# Patient Record
Sex: MALE | Race: WHITE | Employment: OTHER | URBAN - METROPOLITAN AREA
[De-identification: names, ages, dates, MRNs, and addresses within clinical notes are randomized per-mention and may not be internally consistent; named-entity substitution may affect disease eponyms.]

---

## 2021-10-14 ENCOUNTER — HOSPITAL ENCOUNTER (OUTPATIENT)
Dept: CT IMAGING | Age: 56
Discharge: HOME | End: 2021-10-14
Attending: NURSE PRACTITIONER

## 2021-10-14 DIAGNOSIS — R31.0 GROSS HEMATURIA: ICD-10-CM

## 2021-10-14 RX ORDER — SODIUM CHLORIDE 0.9 % (FLUSH) 0.9 %
10 SYRINGE (ML) INJECTION
Status: COMPLETED | OUTPATIENT
Start: 2021-10-14 | End: 2021-10-14

## 2021-10-14 RX ADMIN — Medication 10 ML: at 08:44

## 2021-10-14 NOTE — PROGRESS NOTES
CT shows the kidneys to over all look good. We will need to proceed with cystoscopy ( a camera look up in the bladder. ) we can have this scheduled in the office. WE will call you to arrange a time.

## 2022-06-07 ENCOUNTER — OFFICE VISIT (OUTPATIENT)
Dept: UROLOGY | Age: 57
End: 2022-06-07
Payer: MEDICARE

## 2022-06-07 DIAGNOSIS — N40.1 BENIGN PROSTATIC HYPERPLASIA WITH LOWER URINARY TRACT SYMPTOMS, SYMPTOM DETAILS UNSPECIFIED: ICD-10-CM

## 2022-06-07 DIAGNOSIS — N52.9 ERECTILE DYSFUNCTION, UNSPECIFIED ERECTILE DYSFUNCTION TYPE: Primary | ICD-10-CM

## 2022-06-07 DIAGNOSIS — R31.0 GROSS HEMATURIA: ICD-10-CM

## 2022-06-07 LAB
BILIRUBIN, URINE, POC: NEGATIVE
BLOOD URINE, POC: NORMAL
GLUCOSE URINE, POC: NEGATIVE
KETONES, URINE, POC: NEGATIVE
LEUKOCYTE ESTERASE, URINE, POC: NORMAL
NITRITE, URINE, POC: NEGATIVE
PH, URINE, POC: 6.5 (ref 4.6–8)
PROTEIN,URINE, POC: NEGATIVE
SPECIFIC GRAVITY, URINE, POC: 1 (ref 1–1.03)
URINALYSIS CLARITY, POC: NORMAL
URINALYSIS COLOR, POC: NORMAL
UROBILINOGEN, POC: NORMAL

## 2022-06-07 PROCEDURE — G8428 CUR MEDS NOT DOCUMENT: HCPCS | Performed by: NURSE PRACTITIONER

## 2022-06-07 PROCEDURE — 99214 OFFICE O/P EST MOD 30 MIN: CPT | Performed by: NURSE PRACTITIONER

## 2022-06-07 PROCEDURE — 4004F PT TOBACCO SCREEN RCVD TLK: CPT | Performed by: NURSE PRACTITIONER

## 2022-06-07 PROCEDURE — G8417 CALC BMI ABV UP PARAM F/U: HCPCS | Performed by: NURSE PRACTITIONER

## 2022-06-07 PROCEDURE — 3017F COLORECTAL CA SCREEN DOC REV: CPT | Performed by: NURSE PRACTITIONER

## 2022-06-07 PROCEDURE — 81003 URINALYSIS AUTO W/O SCOPE: CPT | Performed by: NURSE PRACTITIONER

## 2022-06-07 RX ORDER — FINASTERIDE 5 MG/1
5 TABLET, FILM COATED ORAL DAILY
Qty: 90 TABLET | Refills: 3 | Status: SHIPPED | OUTPATIENT
Start: 2022-06-07

## 2022-06-07 RX ORDER — PAROXETINE HYDROCHLORIDE 20 MG/1
20 TABLET, FILM COATED ORAL DAILY
COMMUNITY

## 2022-06-07 RX ORDER — GLIPIZIDE 5 MG/1
TABLET, FILM COATED, EXTENDED RELEASE ORAL
COMMUNITY
Start: 2022-04-29

## 2022-06-07 ASSESSMENT — ENCOUNTER SYMPTOMS
RESPIRATORY NEGATIVE: 1
EYES NEGATIVE: 1

## 2022-06-07 NOTE — PROGRESS NOTES
[address], [address]  [phone]          Ketan Palacios  : 1965    Chief Complaint   Patient presents with    Hematuria          HPI     Rimma Boston is a 55 y.o. male    Here today with complaints of gross hematuria. He reports that hematuria has continued to be on and off since he had a hematuria work-up in October. The urine at times is just pink-tinged. He has not had any bright red blood or clotting. 2021 he had cystoscopy CT and the cystoscopy showed a severely obstructed lateral lobe enlargement, 3 cm. 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 He denies any dysuria or feeling of incomplete emptying. He is up 1 time a night to void. He is also concerned about his ED. He is currently using Trimix and at times is not successful with a full erection. He has been using up to 0.4 mL. He does have some discomfort with injection and will have some bruising due to Xarelto usage. He is wanting to discuss other options. Past Medical History:   Diagnosis Date    Diabetes ([address]     Hypertension      Past Surgical History:   Procedure Laterality Date    OTHER SURGICAL HISTORY      spinal    OTHER SURGICAL HISTORY      knee    OTHER SURGICAL HISTORY      nasal surgery      Current Outpatient Medications   Medication Sig Dispense Refill    glipiZIDE (GLUCOTROL XL) 5 MG extended release tablet TAKE 1 TABLET BY MOUTH EVERY DAY      PARoxetine (PAXIL) 20 MG tablet Take 20 mg by mouth daily      finasteride (PROSCAR) 5 MG tablet Take 1 tablet by mouth daily 90 tablet 3    Papav-Phentolamine-Alprostadil (SUPER TRI-MIX) 150- MG-MG-MCG SOLR 1 each by IntraCAVernosal route as needed (erectile dysfunction) PGE1: 40 mcg/ml; Papaverine: 30 mg/ml, Phentolamine: 3 mg/ml;  + all other supplies as needed.  1 each 5    fexofenadine (ALLEGRA) 180 MG tablet Take 180 mg by mouth daily      gabapentin (NEURONTIN) 300 MG capsule Take 300 mg by mouth 3 times daily.  lacosamide (VIMPAT) 100 MG TABS tablet Take 100 mg by mouth 2 times daily.  lacosamide (VIMPAT) 50 MG TABS tablet Take 2 bid      lamoTRIgine (LAMICTAL) 25 MG tablet Take one bid for one month than 2 bid for one month than 3 bid      losartan (COZAAR) 100 MG tablet Take by mouth      metFORMIN (GLUCOPHAGE) 500 MG tablet Take 2,000 mg by mouth      pravastatin (PRAVACHOL) 20 MG tablet Take 20 mg by mouth      rivaroxaban (XARELTO) 20 MG TABS tablet Take 20 mg by mouth daily (with breakfast)      rOPINIRole (REQUIP) 1 MG tablet Take 1 mg by mouth 5 times daily      triamcinolone (NASACORT) 55 MCG/ACT nasal inhaler 2 sprays by Nasal route daily      zolpidem (AMBIEN CR) 12.5 MG extended release tablet Take 12.5 mg by mouth.  divalproex (DEPAKOTE) 250 MG DR tablet Take 250 mg by mouth 2 times daily      HYDROcodone-acetaminophen (NORCO)  MG per tablet TAKE 1 TABLET BY MOUTH DAILY      OXcarbazepine (TRILEPTAL) 150 MG tablet Take 150 mg by mouth 2 times daily       No current facility-administered medications for this visit.      No Known Allergies  Social History     Socioeconomic History    Marital status: Single     Spouse name: Not on file    Number of children: Not on file    Years of education: Not on file    Highest education level: Not on file   Occupational History    Not on file   Tobacco Use    Smoking status: Current Every Day Smoker     Packs/day: 1.00    Smokeless tobacco: Never Used   Substance and Sexual Activity    Alcohol use: Not Currently    Drug use: Not on file    Sexual activity: Not on file   Other Topics Concern    Not on file   Social History Narrative    Not on file     Social Determinants of Health     Financial Resource Strain:     Difficulty of Paying Living Expenses: Not on file   Food Insecurity:     Worried About Running Out of Food in the Last Year: Not on file    Emma of Food in the Last Year: Not on file   Transportation Needs:     Lack of Transportation (Medical): Not on file    Lack of Transportation (Non-Medical): Not on file   Physical Activity:     Days of Exercise per Week: Not on file    Minutes of Exercise per Session: Not on file   Stress:     Feeling of Stress : Not on file   Social Connections:     Frequency of Communication with Friends and Family: Not on file    Frequency of Social Gatherings with Friends and Family: Not on file    Attends Voodoo Services: Not on file    Active Member of [address] [place] [address] [employer] or Organizations: Not on file    Attends Club or Organization Meetings: Not on file    Marital Status: Not on file   Intimate Partner Violence:     Fear of Current or Ex-Partner: Not on file    Emotionally Abused: Not on file    Physically Abused: Not on file    Sexually Abused: Not on file   Housing Stability:     Unable to Pay for Housing in the Last Year: Not on file    Number of [place] in the Last Year: Not on file    Unstable Housing in the Last Year: Not on file     Family History   Problem Relation Age of Onset    Cancer Father     Diabetes Father        Review of Systems  Constitutional: Negative  Skin: Negative skin ROS  Eyes: Eyes negative  ENT: HENT negative  Respiratory: Respiratory negative  Cardiovascular: Positive for hypertension. GI: Neg GI ROS  Genitourinary: Positive for hematuria.   Musculoskeletal: Musculoskeletal negative  Neurological: Neg neuro ROS  Psychological: Neg psych ROS  Endocrine: Endocrine negative  Hem/Lymphatic: Hematologic/lymphatic negative      Urinalysis  UA - Dipstick  Results for orders placed or performed in visit on 06/07/22   AMB POC URINALYSIS DIP STICK AUTO W/O MICRO   Result Value Ref Range    Color (UA POC)      Clarity (UA POC)      Glucose, Urine, POC Negative Negative    Bilirubin, Urine, POC Negative Negative    KETONES, Urine, POC Negative Negative    Specific Gravity, Urine, POC 1.005 1.001 - 1.035 Blood (UA POC) Large Negative    pH, Urine, POC 6.5 4.6 - 8.0    Protein, Urine, POC Negative Negative    Urobilinogen, POC 0.2 mg/dL     Nitrite, Urine, POC Negative Negative    Leukocyte Esterase, Urine, POC Trace Negative       PHYSICAL EXAM    GENERAL: No acute distress, Awake, Alert, Oriented X 3, Gait normal  ABDOMEN: soft, non tender, non-distended, positive bowel sounds, no organomegaly, no palpable masses, no guarding, no rebound tenderness  SKIN: No rash, no erythema, no lacerations or abrasions, no ecchymosis  MUSCULOSKELETAL - MAEW, no edema     Assessment and Plan    ICD-10-CM    1. Erectile dysfunction, unspecified erectile dysfunction type  N52.9 AMB POC URINALYSIS DIP STICK AUTO W/O MICRO     Papav-Phentolamine-Alprostadil (SUPER TRI-MIX) 150- MG-MG-MCG SOLR   2. Gross hematuria  R31.0    3. Benign prostatic hyperplasia with lower urinary tract symptoms, symptom details unspecified  N40.1      ED DISCUSSION:  We discussed all treatment options including PDE 5 inhibitors, injection therapy, urethral suppository, vacuum erection device and penile prostheses. PLAN:  He would like to try super trimix to see If he has better response. Penile vacuum pump info was also given per his request.   We will add finasteride   He has follow up in Oct with Dr. Barber Ramos. He will keep that appt. Jian Cuellar, NP, APRN - NP  Dr. Yarelis Lam is supervising physician today and he approves plan of care. Return for should have OV with Barber Ramos in Oct..     Elements of this note have been dictated using speech recognition software. Although reviewed, errors of speech recognition may have occurred.

## 2022-06-08 ENCOUNTER — PATIENT MESSAGE (OUTPATIENT)
Dept: UROLOGY | Age: 57
End: 2022-06-08

## 2022-06-08 NOTE — TELEPHONE ENCOUNTER
From: Amol Palacios  To: Scott Lopez  Sent: 6/8/2022 10:11 AM EDT  Subject: PSA    With this bleeding being related to prostate, is my PSA a concern? And is this the beginning of prostate issues I need to be concerned about? I forgot to ask these questions yesterday.     Thanks,   Charlene Rutledge

## 2022-08-12 ENCOUNTER — OFFICE VISIT (OUTPATIENT)
Dept: NEUROLOGY | Age: 57
End: 2022-08-12
Payer: MEDICARE

## 2022-08-12 DIAGNOSIS — G62.9 NEUROPATHY: Primary | ICD-10-CM

## 2022-08-12 DIAGNOSIS — M46.20 SPINAL ABSCESS (HCC): ICD-10-CM

## 2022-08-12 DIAGNOSIS — G56.03 BILATERAL CARPAL TUNNEL SYNDROME: ICD-10-CM

## 2022-08-12 DIAGNOSIS — G25.3 MYOCLONIC JERKING: ICD-10-CM

## 2022-08-12 PROCEDURE — G8427 DOCREV CUR MEDS BY ELIG CLIN: HCPCS | Performed by: PSYCHIATRY & NEUROLOGY

## 2022-08-12 PROCEDURE — 4004F PT TOBACCO SCREEN RCVD TLK: CPT | Performed by: PSYCHIATRY & NEUROLOGY

## 2022-08-12 PROCEDURE — 3017F COLORECTAL CA SCREEN DOC REV: CPT | Performed by: PSYCHIATRY & NEUROLOGY

## 2022-08-12 PROCEDURE — G8417 CALC BMI ABV UP PARAM F/U: HCPCS | Performed by: PSYCHIATRY & NEUROLOGY

## 2022-08-12 PROCEDURE — 99214 OFFICE O/P EST MOD 30 MIN: CPT | Performed by: PSYCHIATRY & NEUROLOGY

## 2022-08-12 RX ORDER — LACOSAMIDE 100 MG/1
TABLET ORAL
Qty: 75 TABLET | Refills: 5 | Status: SHIPPED | OUTPATIENT
Start: 2022-08-12 | End: 2022-09-11

## 2022-08-12 ASSESSMENT — ENCOUNTER SYMPTOMS
EYES NEGATIVE: 1
GASTROINTESTINAL NEGATIVE: 1
ALLERGIC/IMMUNOLOGIC NEGATIVE: 1
RESPIRATORY NEGATIVE: 1

## 2022-08-12 NOTE — PROGRESS NOTES
[address], [address], [address]  Phone: [phone] Fax [phone]  Dr. Michaelle Rocha      8/12/2022  [address]     Patient is referred by the following provider for consultation regarding as below:       I reviewed the available records and notes and have examined patient with the following findings:     Chief Complaint:  No chief complaint on file. HPI: This is a right handed 55 y.o.  male who is very pleasant very appropriate gentleman who unfortunately in 2006 had an acute onset of paralysis of his bilateral lower extremities loss of bowel or bladder and in the work-up they quickly found that he and a bacterial infection I believe an abscess that required thoracic surgical intervention with drainage and antibiotics. 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 He is on high doses of Requip for about 10 years using 5 pills a day but he developed OCD behaviorism's with purchasing silver. He had already failed Depakote Trileptal Lamictal gabapentin and was on this high dose of Requip that we weaned off of. 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 We did set the patient up for an MRI of the cervical and thoracic spine last evaluation he felt he was assured he had it done and we could not find the imaging studies. He now tells me he is not sure if he ever had it done. And we cannot find them anywhere. He has failed on Depakote Trileptal Lamictal Requip and gabapentin.   He was tried on Vimpat 100 mg he takes 1 twice a day and it is definitely help with the myoclonus during the day but at night it still very bothersome. He states his neuropathy in his hands is progressively gotten much worse he had EMG and nerve studies in the past but that was several years ago there were normal for carpal tunnel. But I think is worthwhile redoing. IMAGING REVIEW:  I REVIEWED PERTINENT  IMAGES AND REPORTS WITH THE PATIENT PERSONALLY, DIRECTLY AND FULLY.      Past Medical History:  Past Medical History:   Diagnosis Date    Diabetes ([address]     Hypertension        Past Surgical History:  Past Surgical History:   Procedure Laterality Date    OTHER SURGICAL HISTORY  2006    spinal    OTHER SURGICAL HISTORY  1990    knee    OTHER SURGICAL HISTORY  1995    nasal surgery        Social History:  Social History     Socioeconomic History    Marital status: Single     Spouse name: Not on file    Number of children: Not on file    Years of education: Not on file    Highest education level: Not on file   Occupational History    Not on file   Tobacco Use    Smoking status: Every Day     Packs/day: 1.00     Types: Cigarettes    Smokeless tobacco: Never   Substance and Sexual Activity    Alcohol use: Not Currently    Drug use: Not on file    Sexual activity: Not on file   Other Topics Concern    Not on file   Social History Narrative    Not on file     Social Determinants of Health     Financial Resource Strain: Not on file   Food Insecurity: Not on file   Transportation Needs: Not on file   Physical Activity: Not on file   Stress: Not on file   Social Connections: Not on file   Intimate Partner Violence: Not on file   Housing Stability: Not on file       Family History:   Family History   Problem Relation Age of Onset    Cancer Father     Diabetes Father        Current Outpatient Medications on File Prior to Visit   Medication Sig Dispense Refill    Papav-Phentolamine-Alprostadil (SUPER TRI-MIX) 150- MG-MG-MCG SOLR 1 each by IntraCAVernosal route as needed (erectile dysfunction) PGE1: 20 mcg/ml; Papaverine: 30 mg/ml, Phentolamine: 1 mg/ml;  + all other supplies as needed. 1 each 5    glipiZIDE (GLUCOTROL XL) 5 MG extended release tablet TAKE 1 TABLET BY MOUTH EVERY DAY      PARoxetine (PAXIL) 20 MG tablet Take 20 mg by mouth daily      finasteride (PROSCAR) 5 MG tablet Take 1 tablet by mouth daily 90 tablet 3    Papav-Phentolamine-Alprostadil (SUPER TRI-MIX) 150- MG-MG-MCG SOLR 1 each by IntraCAVernosal route as needed (erectile dysfunction) PGE1: 40 mcg/ml; Papaverine: 30 mg/ml, Phentolamine: 3 mg/ml;  + all other supplies as needed. 1 each 5    divalproex (DEPAKOTE) 250 MG DR tablet Take 250 mg by mouth 2 times daily      fexofenadine (ALLEGRA) 180 MG tablet Take 180 mg by mouth daily      gabapentin (NEURONTIN) 300 MG capsule Take 300 mg by mouth 3 times daily. HYDROcodone-acetaminophen (NORCO)  MG per tablet TAKE 1 TABLET BY MOUTH DAILY      lacosamide (VIMPAT) 100 MG TABS tablet Take 100 mg by mouth 2 times daily. lacosamide (VIMPAT) 50 MG TABS tablet Take 2 bid      lamoTRIgine (LAMICTAL) 25 MG tablet Take one bid for one month than 2 bid for one month than 3 bid      losartan (COZAAR) 100 MG tablet Take by mouth      metFORMIN (GLUCOPHAGE) 500 MG tablet Take 2,000 mg by mouth      OXcarbazepine (TRILEPTAL) 150 MG tablet Take 150 mg by mouth 2 times daily      pravastatin (PRAVACHOL) 20 MG tablet Take 20 mg by mouth      rivaroxaban (XARELTO) 20 MG TABS tablet Take 20 mg by mouth daily (with breakfast)      rOPINIRole (REQUIP) 1 MG tablet Take 1 mg by mouth 5 times daily      triamcinolone (NASACORT) 55 MCG/ACT nasal inhaler 2 sprays by Nasal route daily      zolpidem (AMBIEN CR) 12.5 MG extended release tablet Take 12.5 mg by mouth. No current facility-administered medications on file prior to visit. No Known Allergies    Review of Systems:  Review of Systems   Constitutional: Negative. Eyes: Negative. Respiratory: Negative. Cardiovascular: Negative.     Gastrointestinal: Negative. Endocrine: Negative. Genitourinary: Negative. Musculoskeletal: Negative. Skin: Negative. Allergic/Immunologic: Negative. Neurological:         Myoclonic jerking numbness of the bilateral lower extremities and severe numbness of the bilateral upper extremities. Hematological: Negative. No flowsheet data found. No flowsheet data found. There were no vitals filed for this visit. Physical Exam  Constitutional:       Appearance: Normal appearance. HENT:      Head: Normocephalic and atraumatic. Eyes:      Extraocular Movements: Extraocular movements intact and EOM normal.      Pupils: Pupils are equal, round, and reactive to light. Cardiovascular:      Rate and Rhythm: Normal rate and regular rhythm. Pulmonary:      Effort: Pulmonary effort is normal.   Abdominal:      Palpations: Abdomen is soft. Neurological:      Mental Status: He is alert and oriented to person, place, and time. Deep Tendon Reflexes:      Reflex Scores:       Tricep reflexes are 1+ on the right side and 1+ on the left side. Bicep reflexes are 1+ on the right side and 1+ on the left side. Brachioradialis reflexes are 1+ on the right side and 1+ on the left side. Patellar reflexes are 2+ on the right side and 2+ on the left side. Achilles reflexes are 2+ on the right side and 2+ on the left side. Neurologic Exam     Mental Status   Oriented to person, place, and time. Attention: normal. Concentration: normal.   Level of consciousness: alert  Knowledge: good. Cranial Nerves     CN II   Visual fields full to confrontation. CN III, IV, VI   Pupils are equal, round, and reactive to light. Extraocular motions are normal.     CN VII   Facial expression full, symmetric.      Motor Exam   Right arm tone: normal  Left arm tone: normal  Right leg tone: normal  Left leg tone: normal    Sensory Exam   Almost complete loss of pinprick and a decrease in vibratory negative. But it is worth working back up since his upper extremities are worse with neuropathy not his lowers. Were waiting for the EMG nerve conduction study to be redone. The Diagnosis and differential diagnostic considerations, and Rx Tx were reviewed with the patient at length. No orders of the defined types were placed in this encounter. I have spent greater than 50% of visit discussing and counseling of patient  for treatment and diagnostic plan review. Total time 30 min     . Notes: Patient is to continue all medications as directed by prescribing physicians. Continuations on today's visit are made based on the patient's report of current medications.              Dr. Kaylah Dejesus  Consultation Neurology, Neurodiagnostics and Neurotherapeutics  Neuroelectrophysiology, EEG, EMG  [facility]  [address] [place] [address], [address] [facility]  Phone:  [phone]  Fax:   [phone]

## 2022-08-26 ENCOUNTER — OFFICE VISIT (OUTPATIENT)
Dept: NEUROLOGY | Age: 57
End: 2022-08-26
Payer: MEDICARE

## 2022-08-26 DIAGNOSIS — G56.03 BILATERAL CARPAL TUNNEL SYNDROME: Primary | ICD-10-CM

## 2022-08-26 DIAGNOSIS — M79.641 PAIN IN BOTH HANDS: ICD-10-CM

## 2022-08-26 DIAGNOSIS — R20.0 NUMBNESS AND TINGLING IN BOTH HANDS: ICD-10-CM

## 2022-08-26 DIAGNOSIS — R20.2 NUMBNESS AND TINGLING IN BOTH HANDS: ICD-10-CM

## 2022-08-26 DIAGNOSIS — M79.642 PAIN IN BOTH HANDS: ICD-10-CM

## 2022-08-26 PROCEDURE — 95913 NRV CNDJ TEST 13/> STUDIES: CPT | Performed by: PSYCHIATRY & NEUROLOGY

## 2022-08-26 PROCEDURE — 95885 MUSC TST DONE W/NERV TST LIM: CPT | Performed by: PSYCHIATRY & NEUROLOGY

## 2022-08-26 NOTE — PROGRESS NOTES
EMG/Nerve Conduction Study Procedure Note  [address]    [facility], [address] [facility]   [phone]      See above procedure EMG note. Please Note[de-identified]     Data and waveforms * filed under Procedure category [facility]. See Procedure Files for complete data pages. Tamiko Story MD  Consultative Neurology, Neurodiagnostics   [facility]    [address] [place] [address], [address] [place] [address]  Phone:  [phone]  Fax:   [phone]          + + +   Glossary:   MUP: motor unit potential;  SNAP: sensory nerve action potential; Fibs:  Fibrillations; Fascic: fasciculations; IA: insertional activity;  IP: interference pattern;  SCV :  Sensory conduction velocity;  MCV: motor conduction velocity; NOTE[de-identified] muscles are abbreviated latin initials. Nicolet raw datafile[de-identified]   * filed at Procedure or [employer].  *

## 2022-08-29 ENCOUNTER — PATIENT MESSAGE (OUTPATIENT)
Dept: NEUROLOGY | Age: 57
End: 2022-08-29

## 2022-08-29 DIAGNOSIS — R20.2 NUMBNESS AND TINGLING IN BOTH HANDS: ICD-10-CM

## 2022-08-29 DIAGNOSIS — R20.0 NUMBNESS AND TINGLING IN BOTH HANDS: ICD-10-CM

## 2022-08-29 DIAGNOSIS — G56.03 BILATERAL CARPAL TUNNEL SYNDROME: Primary | ICD-10-CM

## 2022-08-30 NOTE — TELEPHONE ENCOUNTER
From: Asuncion Palacios  To: Dr. Jose Rosenbergure: 8/29/2022 6:42 PM EDT  Subject: Nerve Test Results    Dr. Aurora Bear, based on my nerve test results, do you recommend the Carpal Tunnel surgery? and if yes, on one or both hands? What is the next step?     Thanks,   Jai Stone

## 2022-08-30 NOTE — TELEPHONE ENCOUNTER
Please let him know that the EMG and nerve conduction study only suggest carpal tunnel syndrome underneath his neuropathy. That does not mean he is going to have surgery. He should probably wear carpal tunnel syndrome wrist braces from the pharmacy. And if he would like to see a hand surgeon for this we certainly can set this up. This does not mean they are going to do surgery though.

## 2022-08-30 NOTE — TELEPHONE ENCOUNTER
Dr. Aurora Bear . .. I placed the referral the The [address] [place] [address] with COLT. I informed the patient. Closing this encounter.

## 2022-08-30 NOTE — TELEPHONE ENCOUNTER
Chart reviewed. . patient had EMGs with Dr. Kohli on 8/26/22.   Patient's scheduled F/U with you is on 2/2/2023

## 2022-10-26 DIAGNOSIS — N40.1 BENIGN PROSTATIC HYPERPLASIA WITH LOWER URINARY TRACT SYMPTOMS, SYMPTOM DETAILS UNSPECIFIED: ICD-10-CM

## 2022-10-26 DIAGNOSIS — N52.9 ERECTILE DYSFUNCTION, UNSPECIFIED ERECTILE DYSFUNCTION TYPE: Primary | ICD-10-CM

## 2022-10-27 LAB — PSA SERPL-MCNC: 2.1 NG/ML

## 2022-11-01 ENCOUNTER — OFFICE VISIT (OUTPATIENT)
Dept: UROLOGY | Age: 57
End: 2022-11-01
Payer: MEDICARE

## 2022-11-01 DIAGNOSIS — N52.9 ERECTILE DYSFUNCTION, UNSPECIFIED ERECTILE DYSFUNCTION TYPE: ICD-10-CM

## 2022-11-01 DIAGNOSIS — R31.0 GROSS HEMATURIA: Primary | ICD-10-CM

## 2022-11-01 DIAGNOSIS — N40.1 BENIGN PROSTATIC HYPERPLASIA WITH LOWER URINARY TRACT SYMPTOMS, SYMPTOM DETAILS UNSPECIFIED: ICD-10-CM

## 2022-11-01 LAB
BILIRUBIN, URINE, POC: NEGATIVE
BLOOD URINE, POC: ABNORMAL
GLUCOSE URINE, POC: NEGATIVE
KETONES, URINE, POC: NEGATIVE
LEUKOCYTE ESTERASE, URINE, POC: NEGATIVE
NITRITE, URINE, POC: POSITIVE
PH, URINE, POC: 6.5 (ref 4.6–8)
PROTEIN,URINE, POC: 100
SPECIFIC GRAVITY, URINE, POC: 1.02 (ref 1–1.03)
URINALYSIS CLARITY, POC: ABNORMAL
URINALYSIS COLOR, POC: ABNORMAL
UROBILINOGEN, POC: NORMAL

## 2022-11-01 PROCEDURE — G8484 FLU IMMUNIZE NO ADMIN: HCPCS | Performed by: UROLOGY

## 2022-11-01 PROCEDURE — G8427 DOCREV CUR MEDS BY ELIG CLIN: HCPCS | Performed by: UROLOGY

## 2022-11-01 PROCEDURE — G8421 BMI NOT CALCULATED: HCPCS | Performed by: UROLOGY

## 2022-11-01 PROCEDURE — 3017F COLORECTAL CA SCREEN DOC REV: CPT | Performed by: UROLOGY

## 2022-11-01 PROCEDURE — 99214 OFFICE O/P EST MOD 30 MIN: CPT | Performed by: UROLOGY

## 2022-11-01 PROCEDURE — 81003 URINALYSIS AUTO W/O SCOPE: CPT | Performed by: UROLOGY

## 2022-11-01 PROCEDURE — 4004F PT TOBACCO SCREEN RCVD TLK: CPT | Performed by: UROLOGY

## 2022-11-01 ASSESSMENT — ENCOUNTER SYMPTOMS
RESPIRATORY NEGATIVE: 1
EYES NEGATIVE: 1

## 2022-11-01 NOTE — PROGRESS NOTES
[address], [address]  [phone]          Eduard Palacios  : 1965    Chief Complaint   Patient presents with    Erectile Dysfunction          HPI     Santo Sheets is a 65 y.o. malefollowed in regards to ED, bph, and h/o gross hematuria. He tried 20 mg tadalafil and it would rarely cause a mild erection. He had a blood clot on his spine in approximately  and has a DVT as well as well. He is on xarelto. He now uses super trimix in regards to ed and wishes it was stronger. He returned in 10/21 with blood in the urine. Cystoscopy 10/21 revealed friable prostatic vasculature. He began finasteride. He still sees gross hematuria at times. Past Medical History:   Diagnosis Date    Diabetes ([address]     Hypertension      Past Surgical History:   Procedure Laterality Date    OTHER SURGICAL HISTORY      spinal    OTHER SURGICAL HISTORY      knee    OTHER SURGICAL HISTORY      nasal surgery      Current Outpatient Medications   Medication Sig Dispense Refill    Papav-Phentolamine-Alprostadil (SUPER TRI-MIX) 150- MG-MG-MCG SOLR 1 each by IntraCAVernosal route as needed (erectile dysfunction) PGE1: 40 mcg/ml; Papaverine: 30 mg/ml, Phentolamine: 3 mg/ml;  + all other supplies as needed. 1 each 5    glipiZIDE (GLUCOTROL XL) 5 MG extended release tablet TAKE 1 TABLET BY MOUTH EVERY DAY      PARoxetine (PAXIL) 20 MG tablet Take 20 mg by mouth daily      finasteride (PROSCAR) 5 MG tablet Take 1 tablet by mouth daily 90 tablet 3    Papav-Phentolamine-Alprostadil (SUPER TRI-MIX) 150- MG-MG-MCG SOLR 1 each by IntraCAVernosal route as needed (erectile dysfunction) PGE1: 40 mcg/ml; Papaverine: 30 mg/ml, Phentolamine: 3 mg/ml;  + all other supplies as needed. 1 each 5    fexofenadine (ALLEGRA) 180 MG tablet Take 180 mg by mouth daily      gabapentin (NEURONTIN) 300 MG capsule Take 300 mg by mouth 3 times daily. lamoTRIgine (LAMICTAL) 25 MG tablet Take one bid for one month than 2 bid for one month than 3 bid      losartan (COZAAR) 100 MG tablet Take by mouth      metFORMIN (GLUCOPHAGE) 500 MG tablet Take 2,000 mg by mouth      OXcarbazepine (TRILEPTAL) 150 MG tablet Take 150 mg by mouth 2 times daily      pravastatin (PRAVACHOL) 20 MG tablet Take 20 mg by mouth      rivaroxaban (XARELTO) 20 MG TABS tablet Take 20 mg by mouth daily (with breakfast)      rOPINIRole (REQUIP) 1 MG tablet Take 1 mg by mouth 5 times daily      triamcinolone (NASACORT) 55 MCG/ACT nasal inhaler 2 sprays by Nasal route daily      zolpidem (AMBIEN CR) 12.5 MG extended release tablet Take 12.5 mg by mouth. lacosamide (VIMPAT) 100 MG TABS tablet Take one in AM and 1.5 in PM 75 tablet 5    Papav-Phentolamine-Alprostadil (SUPER TRI-MIX) 150- MG-MG-MCG SOLR 1 each by IntraCAVernosal route as needed (erectile dysfunction) PGE1: 20 mcg/ml; Papaverine: 30 mg/ml, Phentolamine: 1 mg/ml;  + all other supplies as needed. 1 each 5    divalproex (DEPAKOTE) 250 MG DR tablet Take 250 mg by mouth 2 times daily      HYDROcodone-acetaminophen (NORCO)  MG per tablet TAKE 1 TABLET BY MOUTH DAILY      lacosamide (VIMPAT) 100 MG TABS tablet Take 100 mg by mouth 2 times daily. lacosamide (VIMPAT) 50 MG TABS tablet Take 2 bid       No current facility-administered medications for this visit.      No Known Allergies  Social History     Socioeconomic History    Marital status: Single     Spouse name: Not on file    Number of children: Not on file    Years of education: Not on file    Highest education level: Not on file   Occupational History    Not on file   Tobacco Use    Smoking status: Every Day     Packs/day: 1.00     Types: Cigarettes    Smokeless tobacco: Never   Substance and Sexual Activity    Alcohol use: Not Currently    Drug use: Not on file    Sexual activity: Not on file   Other Topics Concern    Not on file   Social History Narrative    Not on file     Social Determinants of Health     Financial Resource Strain: Not on file   Food Insecurity: Not on file   Transportation Needs: Not on file   Physical Activity: Not on file   Stress: Not on file   Social Connections: Not on file   Intimate Partner Violence: Not on file   Housing Stability: Not on file     Family History   Problem Relation Age of Onset    Cancer Father     Diabetes Father        Review of Systems  Constitutional: Negative  Negative for fever. Skin: Negative skin ROS  Eyes: Eyes negative  ENT: HENT negative  Respiratory: Respiratory negative  Cardiovascular: Positive for hypertension. GI: Neg GI ROS  Genitourinary: Positive for hematuria and erectile dysfunction. Musculoskeletal: Musculoskeletal negative  Neurological: Neg neuro ROS  Psychological: Neg psych ROS  Endocrine: Endocrine negative  Hem/Lymphatic: Hematologic/lymphatic negative    Urinalysis  UA - Dipstick  Results for orders placed or performed in visit on 11/01/22   AMB POC URINALYSIS DIP STICK AUTO W/O MICRO   Result Value Ref Range    Color (UA POC)      Clarity (UA POC)      Glucose, Urine, POC Negative Negative    Bilirubin, Urine, POC Negative Negative    KETONES, Urine, POC Negative Negative    Specific Gravity, Urine, POC 1.020 1.001 - 1.035    Blood (UA POC) Large Negative    pH, Urine, POC 6.5 4.6 - 8.0    Protein, Urine,  Negative    Urobilinogen, POC Normal     Nitrite, Urine, POC Positive Negative    Leukocyte Esterase, Urine, POC Negative Negative       There were no vitals taken for this visit. GENERAL: NAD, ALERT, A&O x 3, GAIT NORMAL  LUNGS: easy work of breathing  ABDOMEN: soft, non tender  NEUROLOGIC: cranial nerves 2-12 grossly intact           Assessment and Plan    ICD-10-CM    1. Gross hematuria  R31.0 AMB POC URINALYSIS DIP STICK AUTO W/O MICRO      2.  Erectile dysfunction, unspecified erectile dysfunction type  N52.9 AMB POC URINALYSIS DIP STICK AUTO W/O MICRO Papav-Phentolamine-Alprostadil (SUPER TRI-MIX) 150- MG-MG-MCG SOLR      3. Benign prostatic hyperplasia with lower urinary tract symptoms, symptom details unspecified  N40.1 AMB POC URINALYSIS DIP STICK AUTO W/O MICRO        I had a long discussion today with pt. in regards to hematuria. We discussed potential causes such as infection, friable prostatic vessels, bladder tumor, renal tumor, nephrolithiasis, or idiopathic cause. In regards to work up, CT abd/pelvis without contrast/with contrast/with delayed images was ordered for upper tract imaging. Lastly, pt. will follow up for cystoscopy and to go over results. Super Super trimix was prescribed in regards to ed.

## 2022-11-09 DIAGNOSIS — R31.0 GROSS HEMATURIA: Primary | ICD-10-CM

## 2022-11-16 ENCOUNTER — HOSPITAL ENCOUNTER (OUTPATIENT)
Dept: CT IMAGING | Age: 57
Discharge: HOME | End: 2022-11-18
Payer: MEDICARE

## 2022-11-16 DIAGNOSIS — R31.0 GROSS HEMATURIA: ICD-10-CM

## 2022-11-16 LAB — CREAT BLD-MCNC: 0.67 MG/DL (ref 0.8–1.5)

## 2022-11-16 PROCEDURE — 6360000004 HC RX CONTRAST MEDICATION: Performed by: UROLOGY

## 2022-11-16 PROCEDURE — 82565 ASSAY OF CREATININE: CPT

## 2022-11-16 PROCEDURE — 74178 CT ABD&PLV WO CNTR FLWD CNTR: CPT

## 2022-11-16 RX ADMIN — IOPAMIDOL 100 ML: 755 INJECTION, SOLUTION INTRAVENOUS at 13:48

## 2022-11-30 ENCOUNTER — OFFICE VISIT (OUTPATIENT)
Dept: UROLOGY | Age: 57
End: 2022-11-30
Payer: MEDICARE

## 2022-11-30 DIAGNOSIS — R31.0 GROSS HEMATURIA: Primary | ICD-10-CM

## 2022-11-30 LAB
BILIRUBIN, URINE, POC: NEGATIVE
BLOOD URINE, POC: NORMAL
GLUCOSE URINE, POC: NEGATIVE
KETONES, URINE, POC: NEGATIVE
LEUKOCYTE ESTERASE, URINE, POC: NEGATIVE
NITRITE, URINE, POC: NEGATIVE
PH, URINE, POC: 5.5 (ref 4.6–8)
PROTEIN,URINE, POC: 100
SPECIFIC GRAVITY, URINE, POC: 1.02 (ref 1–1.03)
URINALYSIS CLARITY, POC: NORMAL
URINALYSIS COLOR, POC: NORMAL
UROBILINOGEN, POC: NORMAL

## 2022-11-30 PROCEDURE — G8421 BMI NOT CALCULATED: HCPCS | Performed by: NURSE PRACTITIONER

## 2022-11-30 PROCEDURE — G8484 FLU IMMUNIZE NO ADMIN: HCPCS | Performed by: NURSE PRACTITIONER

## 2022-11-30 PROCEDURE — 81003 URINALYSIS AUTO W/O SCOPE: CPT | Performed by: NURSE PRACTITIONER

## 2022-11-30 PROCEDURE — G8428 CUR MEDS NOT DOCUMENT: HCPCS | Performed by: NURSE PRACTITIONER

## 2022-11-30 PROCEDURE — 99213 OFFICE O/P EST LOW 20 MIN: CPT | Performed by: NURSE PRACTITIONER

## 2022-11-30 PROCEDURE — 3017F COLORECTAL CA SCREEN DOC REV: CPT | Performed by: NURSE PRACTITIONER

## 2022-11-30 PROCEDURE — 4004F PT TOBACCO SCREEN RCVD TLK: CPT | Performed by: NURSE PRACTITIONER

## 2022-11-30 ASSESSMENT — ENCOUNTER SYMPTOMS
BACK PAIN: 0
NAUSEA: 0

## 2022-11-30 NOTE — PROGRESS NOTES
[facility]  [address] [facility] [address], [address]  [phone]          [facility]  : 1965    Chief Complaint   Patient presents with    Follow-up    Hematuria          OPAL Otoole is a 65 y.o. male here today to discuss CT. Patient's been having episodes of gross hematuria. He was evaluated by Dr. Dolly Cisneros 2022. Dr. Dolly Cisneros had a long discussion with patient's about hematuria itself. Patient has had previous hematuria. Work-up in  was negative other than friable prostate. Dr. Dolly Cisneros wanted to repeat CT scan and have him follow-up with cystoscopy. His cystoscopy is not scheduled until next month. CT Impression:  . No CT findings to account for patient's hematuria. No urinary tract calculi. No renal mass. Collecting systems, ureters and bladder are unremarkable. Followed by Dr. Dolly Cisneros in regards to ED, bph, and h/o gross hematuria. He tried 20 mg tadalafil and it would rarely cause a mild erection. He had a blood clot on his spine in approximately  and has a DVT as well. He is on xarelto. He now uses super trimix in regards to ed and wishes it was stronger. He returned in 10/21 with blood in the urine. Cystoscopy 10/21 revealed friable prostatic vasculature. He began finasteride. He still sees gross hematuria at times. Past Medical History:   Diagnosis Date    Diabetes ([address]     Hypertension      Past Surgical History:   Procedure Laterality Date    OTHER SURGICAL HISTORY      spinal    OTHER SURGICAL HISTORY      knee    OTHER SURGICAL HISTORY      nasal surgery      Current Outpatient Medications   Medication Sig Dispense Refill    Papav-Phentolamine-Alprostadil (SUPER TRI-MIX) 150- MG-MG-MCG SOLR 1 each by IntraCAVernosal route as needed (erectile dysfunction) PGE1: 40 mcg/ml; Papaverine: 30 mg/ml, Phentolamine: 3 mg/ml;  + all other supplies as needed.  1 each 5    glipiZIDE (GLUCOTROL XL) 5 MG extended release tablet TAKE 1 TABLET BY MOUTH EVERY DAY      PARoxetine (PAXIL) 20 MG tablet Take 20 mg by mouth daily      finasteride (PROSCAR) 5 MG tablet Take 1 tablet by mouth daily 90 tablet 3    Papav-Phentolamine-Alprostadil (SUPER TRI-MIX) 150- MG-MG-MCG SOLR 1 each by IntraCAVernosal route as needed (erectile dysfunction) PGE1: 40 mcg/ml; Papaverine: 30 mg/ml, Phentolamine: 3 mg/ml;  + all other supplies as needed. 1 each 5    fexofenadine (ALLEGRA) 180 MG tablet Take 180 mg by mouth daily      gabapentin (NEURONTIN) 300 MG capsule Take 300 mg by mouth 3 times daily. lamoTRIgine (LAMICTAL) 25 MG tablet Take one bid for one month than 2 bid for one month than 3 bid      losartan (COZAAR) 100 MG tablet Take by mouth      metFORMIN (GLUCOPHAGE) 500 MG tablet Take 2,000 mg by mouth      OXcarbazepine (TRILEPTAL) 150 MG tablet Take 150 mg by mouth 2 times daily      pravastatin (PRAVACHOL) 20 MG tablet Take 20 mg by mouth      rivaroxaban (XARELTO) 20 MG TABS tablet Take 20 mg by mouth daily (with breakfast)      rOPINIRole (REQUIP) 1 MG tablet Take 1 mg by mouth 5 times daily      triamcinolone (NASACORT) 55 MCG/ACT nasal inhaler 2 sprays by Nasal route daily      zolpidem (AMBIEN CR) 12.5 MG extended release tablet Take 12.5 mg by mouth. lacosamide (VIMPAT) 100 MG TABS tablet Take one in AM and 1.5 in PM 75 tablet 5    Papav-Phentolamine-Alprostadil (SUPER TRI-MIX) 150- MG-MG-MCG SOLR 1 each by IntraCAVernosal route as needed (erectile dysfunction) PGE1: 20 mcg/ml; Papaverine: 30 mg/ml, Phentolamine: 1 mg/ml;  + all other supplies as needed. 1 each 5    divalproex (DEPAKOTE) 250 MG DR tablet Take 250 mg by mouth 2 times daily      HYDROcodone-acetaminophen (NORCO)  MG per tablet TAKE 1 TABLET BY MOUTH DAILY      lacosamide (VIMPAT) 100 MG TABS tablet Take 100 mg by mouth 2 times daily.       lacosamide (VIMPAT) 50 MG TABS tablet Take 2 bid       No current facility-administered medications for this visit. No Known Allergies  Social History     Socioeconomic History    Marital status: Single     Spouse name: Not on file    Number of children: Not on file    Years of education: Not on file    Highest education level: Not on file   Occupational History    Not on file   Tobacco Use    Smoking status: Every Day     Packs/day: 1.00     Types: Cigarettes    Smokeless tobacco: Never   Substance and Sexual Activity    Alcohol use: Not Currently    Drug use: Not on file    Sexual activity: Not on file   Other Topics Concern    Not on file   Social History Narrative    Not on file     Social Determinants of Health     Financial Resource Strain: Not on file   Food Insecurity: Not on file   Transportation Needs: Not on file   Physical Activity: Not on file   Stress: Not on file   Social Connections: Not on file   Intimate Partner Violence: Not on file   Housing Stability: Not on file     Family History   Problem Relation Age of Onset    Cancer Father     Diabetes Father        Review of Systems  Constitutional:   Negative for fever. GI:  Negative for nausea. Genitourinary: Positive for hematuria. Musculoskeletal:  Negative for back pain.     Urinalysis  UA - Dipstick  Results for orders placed or performed in visit on 11/30/22   AMB POC URINALYSIS DIP STICK AUTO W/O MICRO   Result Value Ref Range    Color (UA POC)      Clarity (UA POC)      Glucose, Urine, POC Negative Negative    Bilirubin, Urine, POC Negative Negative    KETONES, Urine, POC Negative Negative    Specific Gravity, Urine, POC 1.025 1.001 - 1.035    Blood (UA POC) Large Negative    pH, Urine, POC 5.5 4.6 - 8.0    Protein, Urine,  Negative    Urobilinogen, POC 0.2 mg/dL     Nitrite, Urine, POC Negative Negative    Leukocyte Esterase, Urine, POC Negative Negative       PHYSICAL EXAM    GENERAL: No acute distress, Awake, Alert, Oriented X 3, Gait normal  ABDOMEN: soft, non tender, non-distended, positive bowel sounds, no organomegaly, no palpable masses, no guarding, no rebound tenderness  SKIN: No rash, no erythema, no lacerations or abrasions, no ecchymosis  MUSCULOSKELETAL - MAEW, no edema       Assessment and Plan    ICD-10-CM    1. Gross hematuria  R31.0 AMB POC URINALYSIS DIP STICK AUTO W/O MICRO        PLAN:  CT discussed  I tried to relieve pt of any fears. I do feel the bleeding is still from prostate, but he has a cystoscopy arranged for next month that he will keep. PT will keep appt for cystoscopy with Dr. Keli Valero. SUMANTH Seay - NP  Dr. Sondra Robertson is supervising physician today and he approves plan of care. Return for keep previosly arranged OV. Elements of this note have been dictated using speech recognition software. Although reviewed, errors of speech recognition may have occurred.

## 2022-12-20 ENCOUNTER — PROCEDURE VISIT (OUTPATIENT)
Dept: UROLOGY | Age: 57
End: 2022-12-20
Payer: MEDICARE

## 2022-12-20 DIAGNOSIS — N13.8 BENIGN PROSTATIC HYPERPLASIA WITH URINARY OBSTRUCTION: ICD-10-CM

## 2022-12-20 DIAGNOSIS — R31.0 GROSS HEMATURIA: Primary | ICD-10-CM

## 2022-12-20 DIAGNOSIS — N40.1 BENIGN PROSTATIC HYPERPLASIA WITH URINARY OBSTRUCTION: ICD-10-CM

## 2022-12-20 LAB
BILIRUBIN, URINE, POC: NEGATIVE
BLOOD URINE, POC: NEGATIVE
GLUCOSE URINE, POC: NEGATIVE
KETONES, URINE, POC: NEGATIVE
LEUKOCYTE ESTERASE, URINE, POC: NEGATIVE
NITRITE, URINE, POC: NEGATIVE
PH, URINE, POC: 6 (ref 4.6–8)
PROTEIN,URINE, POC: NEGATIVE
SPECIFIC GRAVITY, URINE, POC: 1.02 (ref 1–1.03)
URINALYSIS CLARITY, POC: NORMAL
URINALYSIS COLOR, POC: NORMAL
UROBILINOGEN, POC: NORMAL

## 2022-12-20 PROCEDURE — G8427 DOCREV CUR MEDS BY ELIG CLIN: HCPCS | Performed by: UROLOGY

## 2022-12-20 PROCEDURE — 4004F PT TOBACCO SCREEN RCVD TLK: CPT | Performed by: UROLOGY

## 2022-12-20 PROCEDURE — 99214 OFFICE O/P EST MOD 30 MIN: CPT | Performed by: UROLOGY

## 2022-12-20 PROCEDURE — 81003 URINALYSIS AUTO W/O SCOPE: CPT | Performed by: UROLOGY

## 2022-12-20 PROCEDURE — G8421 BMI NOT CALCULATED: HCPCS | Performed by: UROLOGY

## 2022-12-20 PROCEDURE — 52000 CYSTOURETHROSCOPY: CPT | Performed by: UROLOGY

## 2022-12-20 PROCEDURE — 3017F COLORECTAL CA SCREEN DOC REV: CPT | Performed by: UROLOGY

## 2022-12-20 PROCEDURE — G8484 FLU IMMUNIZE NO ADMIN: HCPCS | Performed by: UROLOGY

## 2022-12-20 NOTE — Clinical Note
[facility]  [address] [facility] [address]  Phone: [phone]  Fax: [phone]    Rosalio Buckner MD        December 20, 2022     Patient: Prasanna Ceja   YOB: 1965   Date of Visit: 12/20/2022       To Whom It May Concern: It is my medical opinion that Ok Akers {Work release (duty restriction):96071}. If you have any questions or concerns, please don't hesitate to call.     Sincerely,        Rosalio Buckner MD Spontaneous, unlabored and symmetrical

## 2022-12-20 NOTE — PROGRESS NOTES
[facility]  [address] [facility]   [address]  [facility], [address] [place]  [phone]    Alan Shelter  : 1965         HPI   65 y.o., male followed in regards to ED, bph, and h/o gross hematuria. He tried 20 mg tadalafil and it would rarely cause a mild erection. He had a blood clot on his spine in approximately  and has a DVT as well as well. He is on xarelto. He now uses super super trimix in regards to ed. He returned in 10/21 with blood in the urine. Cystoscopy 10/21 revealed friable prostatic vasculature. He began finasteride. He still sees gross hematuria almost daily. CT heme protocol 22 revealed no  upper tract pathology. Past Medical History:   Diagnosis Date    Diabetes ([address]     Hypertension      Past Surgical History:   Procedure Laterality Date    OTHER SURGICAL HISTORY      spinal    OTHER SURGICAL HISTORY      knee    OTHER SURGICAL HISTORY      nasal surgery      Current Outpatient Medications   Medication Sig Dispense Refill    Papav-Phentolamine-Alprostadil (SUPER TRI-MIX) 150- MG-MG-MCG SOLR 1 each by IntraCAVernosal route as needed (erectile dysfunction) PGE1: 40 mcg/ml; Papaverine: 30 mg/ml, Phentolamine: 3 mg/ml;  + all other supplies as needed. 1 each 5    glipiZIDE (GLUCOTROL XL) 5 MG extended release tablet TAKE 1 TABLET BY MOUTH EVERY DAY      PARoxetine (PAXIL) 20 MG tablet Take 20 mg by mouth daily      finasteride (PROSCAR) 5 MG tablet Take 1 tablet by mouth daily 90 tablet 3    Papav-Phentolamine-Alprostadil (SUPER TRI-MIX) 150- MG-MG-MCG SOLR 1 each by IntraCAVernosal route as needed (erectile dysfunction) PGE1: 40 mcg/ml; Papaverine: 30 mg/ml, Phentolamine: 3 mg/ml;  + all other supplies as needed. 1 each 5    fexofenadine (ALLEGRA) 180 MG tablet Take 180 mg by mouth daily      gabapentin (NEURONTIN) 300 MG capsule Take 300 mg by mouth 3 times daily.       lamoTRIgine (LAMICTAL) 25 MG tablet Take one bid for one month than 2 bid for one month than 3 bid      losartan (COZAAR) 100 MG tablet Take by mouth      metFORMIN (GLUCOPHAGE) 500 MG tablet Take 2,000 mg by mouth      OXcarbazepine (TRILEPTAL) 150 MG tablet Take 150 mg by mouth 2 times daily      pravastatin (PRAVACHOL) 20 MG tablet Take 20 mg by mouth      rivaroxaban (XARELTO) 20 MG TABS tablet Take 20 mg by mouth daily (with breakfast)      rOPINIRole (REQUIP) 1 MG tablet Take 1 mg by mouth 5 times daily      triamcinolone (NASACORT) 55 MCG/ACT nasal inhaler 2 sprays by Nasal route daily      zolpidem (AMBIEN CR) 12.5 MG extended release tablet Take 12.5 mg by mouth. lacosamide (VIMPAT) 100 MG TABS tablet Take one in AM and 1.5 in PM 75 tablet 5    Papav-Phentolamine-Alprostadil (SUPER TRI-MIX) 150- MG-MG-MCG SOLR 1 each by IntraCAVernosal route as needed (erectile dysfunction) PGE1: 20 mcg/ml; Papaverine: 30 mg/ml, Phentolamine: 1 mg/ml;  + all other supplies as needed. 1 each 5    divalproex (DEPAKOTE) 250 MG DR tablet Take 250 mg by mouth 2 times daily      HYDROcodone-acetaminophen (NORCO)  MG per tablet TAKE 1 TABLET BY MOUTH DAILY      lacosamide (VIMPAT) 100 MG TABS tablet Take 100 mg by mouth 2 times daily. lacosamide (VIMPAT) 50 MG TABS tablet Take 2 bid       No current facility-administered medications for this visit.      No Known Allergies  Social History     Socioeconomic History    Marital status: Single     Spouse name: Not on file    Number of children: Not on file    Years of education: Not on file    Highest education level: Not on file   Occupational History    Not on file   Tobacco Use    Smoking status: Every Day     Packs/day: 1.00     Types: Cigarettes    Smokeless tobacco: Never   Substance and Sexual Activity    Alcohol use: Not Currently    Drug use: Not on file    Sexual activity: Not on file   Other Topics Concern    Not on file   Social History Narrative    Not on file     Social Determinants of Health This Encounter   Procedures    CYSTOURETHROSCOPY    AMB POC URINALYSIS DIP STICK AUTO W/O MICRO       R dome has a small clot adherent to the wall, possibly from an area of previous bleeding. It does not have the appearance of cancer. Options were discussed. He will ask Dr. Florencia Enriquez if he can switch to any other blood thinner than xarelto. We will see if this leads to cessation of gross hematuria. I will see him in late 1/23. IF bleeding has stopped, office cystoscopy could be scheduled to re-examine R dome spot. IF bleeding continues, he could be booked for cystoscopy and fulguration of that spot. Patient had a complete established visit today that is separately identifiable from procedure performed today. Complete documentation of this separate visit is present.

## 2023-01-24 ENCOUNTER — OFFICE VISIT (OUTPATIENT)
Dept: UROLOGY | Age: 58
End: 2023-01-24
Payer: MEDICARE

## 2023-01-24 ENCOUNTER — TELEPHONE (OUTPATIENT)
Dept: UROLOGY | Age: 58
End: 2023-01-24

## 2023-01-24 DIAGNOSIS — N32.9 LESION OF URINARY BLADDER: ICD-10-CM

## 2023-01-24 DIAGNOSIS — N40.1 BENIGN PROSTATIC HYPERPLASIA WITH URINARY OBSTRUCTION: ICD-10-CM

## 2023-01-24 DIAGNOSIS — R31.0 GROSS HEMATURIA: Primary | ICD-10-CM

## 2023-01-24 DIAGNOSIS — N13.8 BENIGN PROSTATIC HYPERPLASIA WITH URINARY OBSTRUCTION: ICD-10-CM

## 2023-01-24 LAB
BILIRUBIN, URINE, POC: NORMAL
BLOOD URINE, POC: NORMAL
GLUCOSE URINE, POC: 100
KETONES, URINE, POC: NORMAL
LEUKOCYTE ESTERASE, URINE, POC: NEGATIVE
NITRITE, URINE, POC: NEGATIVE
PH, URINE, POC: 5.5 (ref 4.6–8)
PROTEIN,URINE, POC: 300
SPECIFIC GRAVITY, URINE, POC: 1.03 (ref 1–1.03)
URINALYSIS CLARITY, POC: NORMAL
URINALYSIS COLOR, POC: NORMAL
UROBILINOGEN, POC: NORMAL

## 2023-01-24 PROCEDURE — G8427 DOCREV CUR MEDS BY ELIG CLIN: HCPCS | Performed by: UROLOGY

## 2023-01-24 PROCEDURE — 99214 OFFICE O/P EST MOD 30 MIN: CPT | Performed by: UROLOGY

## 2023-01-24 PROCEDURE — 4004F PT TOBACCO SCREEN RCVD TLK: CPT | Performed by: UROLOGY

## 2023-01-24 PROCEDURE — G8421 BMI NOT CALCULATED: HCPCS | Performed by: UROLOGY

## 2023-01-24 PROCEDURE — 81003 URINALYSIS AUTO W/O SCOPE: CPT | Performed by: UROLOGY

## 2023-01-24 PROCEDURE — 3017F COLORECTAL CA SCREEN DOC REV: CPT | Performed by: UROLOGY

## 2023-01-24 PROCEDURE — G8484 FLU IMMUNIZE NO ADMIN: HCPCS | Performed by: UROLOGY

## 2023-01-24 ASSESSMENT — ENCOUNTER SYMPTOMS: NAUSEA: 0

## 2023-01-24 NOTE — H&P (VIEW-ONLY)
[facility]  [address] [facility] [address], [address] [facility]  [phone]          Lashawn Ramirez  : 1965    Chief Complaint   Patient presents with    Follow-up          HPI     Lashawn Ramirez is a 65 y.o. male followed in regards to ED, bph, and h/o gross hematuria. He tried 20 mg tadalafil and it would rarely cause a mild erection. He had a blood clot on his spine in approximately  and has a DVT as well as well. He is on xarelto. He now uses super super trimix in regards to ed. He returned in 10/21 with blood in the urine. Cystoscopy 10/21 revealed friable prostatic vasculature. He began finasteride. He still sees gross hematuria almost daily. CT heme protocol 22 revealed no  upper tract pathology. He held xarelto 10 days ago  and saw no gross hematuria until yesterday. It's tea colored today. Cystoscopy 22 revealed a small adherent clot of the R dome of the bladder. Past Medical History:   Diagnosis Date    Diabetes ([address]     Hypertension      Past Surgical History:   Procedure Laterality Date    OTHER SURGICAL HISTORY      spinal    OTHER SURGICAL HISTORY      knee    OTHER SURGICAL HISTORY      nasal surgery      Current Outpatient Medications   Medication Sig Dispense Refill    Papav-Phentolamine-Alprostadil (SUPER TRI-MIX) 150- MG-MG-MCG SOLR 1 each by IntraCAVernosal route as needed (erectile dysfunction) PGE1: 40 mcg/ml; Papaverine: 30 mg/ml, Phentolamine: 3 mg/ml;  + all other supplies as needed. 1 each 5    lacosamide (VIMPAT) 100 MG TABS tablet Take one in AM and 1.5 in PM 75 tablet 5    Papav-Phentolamine-Alprostadil (SUPER TRI-MIX) 150- MG-MG-MCG SOLR 1 each by IntraCAVernosal route as needed (erectile dysfunction) PGE1: 20 mcg/ml; Papaverine: 30 mg/ml, Phentolamine: 1 mg/ml;  + all other supplies as needed.  1 each 5    glipiZIDE (GLUCOTROL XL) 5 MG extended release tablet TAKE 1 TABLET BY MOUTH EVERY DAY      PARoxetine (PAXIL) 20 MG tablet Take 20 mg by mouth daily      finasteride (PROSCAR) 5 MG tablet Take 1 tablet by mouth daily 90 tablet 3    Papav-Phentolamine-Alprostadil (SUPER TRI-MIX) 150- MG-MG-MCG SOLR 1 each by IntraCAVernosal route as needed (erectile dysfunction) PGE1: 40 mcg/ml; Papaverine: 30 mg/ml, Phentolamine: 3 mg/ml;  + all other supplies as needed. 1 each 5    divalproex (DEPAKOTE) 250 MG DR tablet Take 250 mg by mouth 2 times daily      fexofenadine (ALLEGRA) 180 MG tablet Take 180 mg by mouth daily      gabapentin (NEURONTIN) 300 MG capsule Take 300 mg by mouth 3 times daily. HYDROcodone-acetaminophen (NORCO)  MG per tablet TAKE 1 TABLET BY MOUTH DAILY      lacosamide (VIMPAT) 100 MG TABS tablet Take 100 mg by mouth 2 times daily. lacosamide (VIMPAT) 50 MG TABS tablet Take 2 bid      lamoTRIgine (LAMICTAL) 25 MG tablet Take one bid for one month than 2 bid for one month than 3 bid      losartan (COZAAR) 100 MG tablet Take by mouth      metFORMIN (GLUCOPHAGE) 500 MG tablet Take 2,000 mg by mouth      OXcarbazepine (TRILEPTAL) 150 MG tablet Take 150 mg by mouth 2 times daily      pravastatin (PRAVACHOL) 20 MG tablet Take 20 mg by mouth      rivaroxaban (XARELTO) 20 MG TABS tablet Take 20 mg by mouth daily (with breakfast)      rOPINIRole (REQUIP) 1 MG tablet Take 1 mg by mouth 5 times daily      triamcinolone (NASACORT) 55 MCG/ACT nasal inhaler 2 sprays by Nasal route daily      zolpidem (AMBIEN CR) 12.5 MG extended release tablet Take 12.5 mg by mouth. No current facility-administered medications for this visit.      No Known Allergies  Social History     Socioeconomic History    Marital status: Single     Spouse name: Not on file    Number of children: Not on file    Years of education: Not on file    Highest education level: Not on file   Occupational History    Not on file   Tobacco Use    Smoking status: Every Day     Packs/day: 1.00     Types: Cigarettes    Smokeless tobacco: Never   Substance and Sexual Activity    Alcohol use: Not Currently    Drug use: Not on file    Sexual activity: Not on file   Other Topics Concern    Not on file   Social History Narrative    Not on file     Social Determinants of Health     Financial Resource Strain: Not on file   Food Insecurity: Not on file   Transportation Needs: Not on file   Physical Activity: Not on file   Stress: Not on file   Social Connections: Not on file   Intimate Partner Violence: Not on file   Housing Stability: Not on file     Family History   Problem Relation Age of Onset    Cancer Father     Diabetes Father        Review of Systems  Constitutional:   Negative for fever. GI:  Negative for nausea. Genitourinary:  Negative for flank pain. Urinalysis  UA - Dipstick  Results for orders placed or performed in visit on 01/24/23   AMB POC URINALYSIS DIP STICK AUTO W/O MICRO   Result Value Ref Range    Color (UA POC)      Clarity (UA POC)      Glucose, Urine,  Negative    Bilirubin, Urine, POC Small Negative    KETONES, Urine, POC Trace Negative    Specific Gravity, Urine, POC 1.030 1.001 - 1.035    Blood (UA POC) Large Negative    pH, Urine, POC 5.5 4.6 - 8.0    Protein, Urine,  Negative    Urobilinogen, POC 1 mg/dL     Nitrite, Urine, POC Negative Negative    Leukocyte Esterase, Urine, POC Negative Negative       There were no vitals taken for this visit. GENERAL: NAD, ALERT, A&O x 3, GAIT NORMAL  CARDIAC: regular rate and rhythm  CHEST AND LUNG: Easy work of breathing, clear to auscultation bilaterally  ABDOMEN: soft, non tender, non distended, + bowel sounds, no organomegaly, no palpable masses  NEUROLOGIC: cranial nerves 2-12 grossly intact . Assessment and Plan    ICD-10-CM    1. Gross hematuria  R31.0       2. Lesion of urinary bladder  N32.9       3.  Benign prostatic hyperplasia with urinary obstruction  N40.1 AMB POC URINALYSIS DIP STICK AUTO W/O MICRO    N13.8 He is currently off all blood thinners. Gross hematuria continues. Options were discussed. He will be scheduled for cystoscopy, bladder biopsy, fulguration. Potential risks were discussed.

## 2023-01-24 NOTE — PROGRESS NOTES
[facility]  [address] [facility] [address], [address] [facility]  [phone]          Kristi Sutton  : 1965    Chief Complaint   Patient presents with    Follow-up          HPI     Kristi Sutton is a 65 y.o. male followed in regards to ED, bph, and h/o gross hematuria. He tried 20 mg tadalafil and it would rarely cause a mild erection. He had a blood clot on his spine in approximately  and has a DVT as well as well. He is on xarelto. He now uses super super trimix in regards to ed. He returned in 10/21 with blood in the urine. Cystoscopy 10/21 revealed friable prostatic vasculature. He began finasteride. He still sees gross hematuria almost daily. CT heme protocol 22 revealed no  upper tract pathology. He held xarelto 10 days ago  and saw no gross hematuria until yesterday. It's tea colored today. Cystoscopy 22 revealed a small adherent clot of the R dome of the bladder. Past Medical History:   Diagnosis Date    Diabetes ([address]     Hypertension      Past Surgical History:   Procedure Laterality Date    OTHER SURGICAL HISTORY      spinal    OTHER SURGICAL HISTORY      knee    OTHER SURGICAL HISTORY      nasal surgery      Current Outpatient Medications   Medication Sig Dispense Refill    Papav-Phentolamine-Alprostadil (SUPER TRI-MIX) 150- MG-MG-MCG SOLR 1 each by IntraCAVernosal route as needed (erectile dysfunction) PGE1: 40 mcg/ml; Papaverine: 30 mg/ml, Phentolamine: 3 mg/ml;  + all other supplies as needed. 1 each 5    lacosamide (VIMPAT) 100 MG TABS tablet Take one in AM and 1.5 in PM 75 tablet 5    Papav-Phentolamine-Alprostadil (SUPER TRI-MIX) 150- MG-MG-MCG SOLR 1 each by IntraCAVernosal route as needed (erectile dysfunction) PGE1: 20 mcg/ml; Papaverine: 30 mg/ml, Phentolamine: 1 mg/ml;  + all other supplies as needed.  1 each 5    glipiZIDE (GLUCOTROL XL) 5 MG extended release tablet TAKE 1 TABLET BY MOUTH EVERY DAY      PARoxetine (PAXIL) 20 MG tablet Take 20 mg by mouth daily      finasteride (PROSCAR) 5 MG tablet Take 1 tablet by mouth daily 90 tablet 3    Papav-Phentolamine-Alprostadil (SUPER TRI-MIX) 150- MG-MG-MCG SOLR 1 each by IntraCAVernosal route as needed (erectile dysfunction) PGE1: 40 mcg/ml; Papaverine: 30 mg/ml, Phentolamine: 3 mg/ml;  + all other supplies as needed. 1 each 5    divalproex (DEPAKOTE) 250 MG DR tablet Take 250 mg by mouth 2 times daily      fexofenadine (ALLEGRA) 180 MG tablet Take 180 mg by mouth daily      gabapentin (NEURONTIN) 300 MG capsule Take 300 mg by mouth 3 times daily. HYDROcodone-acetaminophen (NORCO)  MG per tablet TAKE 1 TABLET BY MOUTH DAILY      lacosamide (VIMPAT) 100 MG TABS tablet Take 100 mg by mouth 2 times daily. lacosamide (VIMPAT) 50 MG TABS tablet Take 2 bid      lamoTRIgine (LAMICTAL) 25 MG tablet Take one bid for one month than 2 bid for one month than 3 bid      losartan (COZAAR) 100 MG tablet Take by mouth      metFORMIN (GLUCOPHAGE) 500 MG tablet Take 2,000 mg by mouth      OXcarbazepine (TRILEPTAL) 150 MG tablet Take 150 mg by mouth 2 times daily      pravastatin (PRAVACHOL) 20 MG tablet Take 20 mg by mouth      rivaroxaban (XARELTO) 20 MG TABS tablet Take 20 mg by mouth daily (with breakfast)      rOPINIRole (REQUIP) 1 MG tablet Take 1 mg by mouth 5 times daily      triamcinolone (NASACORT) 55 MCG/ACT nasal inhaler 2 sprays by Nasal route daily      zolpidem (AMBIEN CR) 12.5 MG extended release tablet Take 12.5 mg by mouth. No current facility-administered medications for this visit.      No Known Allergies  Social History     Socioeconomic History    Marital status: Single     Spouse name: Not on file    Number of children: Not on file    Years of education: Not on file    Highest education level: Not on file   Occupational History    Not on file   Tobacco Use    Smoking status: Every Day     Packs/day: 1.00     Types: Cigarettes    Smokeless tobacco: Never   Substance and Sexual Activity    Alcohol use: Not Currently    Drug use: Not on file    Sexual activity: Not on file   Other Topics Concern    Not on file   Social History Narrative    Not on file     Social Determinants of Health     Financial Resource Strain: Not on file   Food Insecurity: Not on file   Transportation Needs: Not on file   Physical Activity: Not on file   Stress: Not on file   Social Connections: Not on file   Intimate Partner Violence: Not on file   Housing Stability: Not on file     Family History   Problem Relation Age of Onset    Cancer Father     Diabetes Father        Review of Systems  Constitutional:   Negative for fever. GI:  Negative for nausea. Genitourinary:  Negative for flank pain. Urinalysis  UA - Dipstick  Results for orders placed or performed in visit on 01/24/23   AMB POC URINALYSIS DIP STICK AUTO W/O MICRO   Result Value Ref Range    Color (UA POC)      Clarity (UA POC)      Glucose, Urine,  Negative    Bilirubin, Urine, POC Small Negative    KETONES, Urine, POC Trace Negative    Specific Gravity, Urine, POC 1.030 1.001 - 1.035    Blood (UA POC) Large Negative    pH, Urine, POC 5.5 4.6 - 8.0    Protein, Urine,  Negative    Urobilinogen, POC 1 mg/dL     Nitrite, Urine, POC Negative Negative    Leukocyte Esterase, Urine, POC Negative Negative       There were no vitals taken for this visit. GENERAL: NAD, ALERT, A&O x 3, GAIT NORMAL  CARDIAC: regular rate and rhythm  CHEST AND LUNG: Easy work of breathing, clear to auscultation bilaterally  ABDOMEN: soft, non tender, non distended, + bowel sounds, no organomegaly, no palpable masses  NEUROLOGIC: cranial nerves 2-12 grossly intact . Assessment and Plan    ICD-10-CM    1. Gross hematuria  R31.0       2. Lesion of urinary bladder  N32.9       3.  Benign prostatic hyperplasia with urinary obstruction  N40.1 AMB POC URINALYSIS DIP STICK AUTO W/O MICRO    N13.8 He is currently off all blood thinners. Gross hematuria continues. Options were discussed. He will be scheduled for cystoscopy, bladder biopsy, fulguration. Potential risks were discussed.

## 2023-01-24 NOTE — TELEPHONE ENCOUNTER
----- Message from Tara Buenrostro MD sent at 1/24/2023  2:58 PM EST -----  Regarding: schedule  Hospital: [address] [place] [address]     Surgeon: malik  Assist: NONE    Diagnosis: gross hematuria, bladder lesion    Procedure: cystoscopy, bladder biopsy, fulguration    Posting time: 1 hour    Special Instruments Needed:  NONE    Anesthesia: GENERAL    Labs: CBC, BMP    Tests: EKG per anesthesia    Blood: NONE    Bowel Prep: NONE    Special Instructions: Friday    Orders/HandP: done/done    Follow up appointment: 2 weeks Rosangela Ferrer

## 2023-01-25 RX ORDER — GLIPIZIDE 10 MG/1
10 TABLET ORAL DAILY
COMMUNITY

## 2023-01-25 RX ORDER — LACOSAMIDE 100 MG/1
100 TABLET ORAL 2 TIMES DAILY
COMMUNITY

## 2023-01-25 NOTE — PERIOP NOTE
Patient verified name and . Order for consent  found in EHR and matches case posting; patient verifies procedure. Type 1B surgery, Phone assessment complete. Orders  received. Labs per surgeon: CBC,BMP ordered DOS  Labs per anesthesia protocol: POC glucose    Patient answered medical/surgical history questions at their best of ability. All prior to admission medications documented in [facility]. Patient instructed to take the following medications the day of surgery according to anesthesia guidelines with a small sip of water:  Allegra, Proscar, Neurontin, Vimpat, Paxil, and Requip. Hold all vitamins 7 days prior to surgery and NSAIDS 5 days prior to surgery. Prescription meds to hold: Focus Factor and Xarelto as instructed by surgeons office. Patient instructed on the following:    > Arrive at [facility], time of arrival to be called the day before by 1700  > NPO after midnight, unless otherwise indicated, including gum, mints, and ice chips  > Responsible adult must drive patient to the hospital, stay during surgery, and patient will need supervision 24 hours after anesthesia  > Use antibacterial Soap in shower the night before surgery and on the morning of surgery  > All piercings must be removed prior to arrival.    > Leave all valuables (money and jewelry) at home but bring insurance card and ID on DOS.   > You may be required to pay a deductible or co-pay on the day of your procedure. You can pre-pay by calling [phone] if your surgery is at the [facility] or [phone] if your surgery is at the [facility]. > Do not wear make-up, nail polish, lotions, cologne, perfumes, powders, or oil on skin. Artificial nails are not permitted.

## 2023-01-29 ENCOUNTER — ANESTHESIA EVENT (OUTPATIENT)
Dept: SURGERY | Age: 58
End: 2023-01-29
Payer: MEDICARE

## 2023-01-30 ENCOUNTER — HOSPITAL ENCOUNTER (OUTPATIENT)
Age: 58
Setting detail: OUTPATIENT SURGERY
Discharge: HOME | End: 2023-01-30
Attending: UROLOGY | Admitting: UROLOGY
Payer: MEDICARE

## 2023-01-30 ENCOUNTER — ANESTHESIA (OUTPATIENT)
Dept: SURGERY | Age: 58
End: 2023-01-30
Payer: MEDICARE

## 2023-01-30 VITALS
TEMPERATURE: 98 F | SYSTOLIC BLOOD PRESSURE: 129 MMHG | HEART RATE: 57 BPM | HEIGHT: 75 IN | BODY MASS INDEX: 39.17 KG/M2 | RESPIRATION RATE: 16 BRPM | WEIGHT: 315 LBS | DIASTOLIC BLOOD PRESSURE: 73 MMHG | OXYGEN SATURATION: 97 %

## 2023-01-30 DIAGNOSIS — R31.0 GROSS HEMATURIA: ICD-10-CM

## 2023-01-30 LAB
ANION GAP SERPL CALC-SCNC: 7 MMOL/L (ref 2–11)
BUN SERPL-MCNC: 18 MG/DL (ref 6–23)
CALCIUM SERPL-MCNC: 8.7 MG/DL (ref 8.3–10.4)
CHLORIDE SERPL-SCNC: 110 MMOL/L (ref 101–110)
CO2 SERPL-SCNC: 21 MMOL/L (ref 21–32)
CREAT SERPL-MCNC: 0.8 MG/DL (ref 0.8–1.5)
ERYTHROCYTE [DISTWIDTH] IN BLOOD BY AUTOMATED COUNT: 14.2 % (ref 11.9–14.6)
GLUCOSE BLD STRIP.AUTO-MCNC: 162 MG/DL (ref 65–100)
GLUCOSE SERPL-MCNC: 175 MG/DL (ref 65–100)
HCT VFR BLD AUTO: 36.1 % (ref 41.1–50.3)
HGB BLD-MCNC: 11.6 G/DL (ref 13.6–17.2)
MCH RBC QN AUTO: 27.6 PG (ref 26.1–32.9)
MCHC RBC AUTO-ENTMCNC: 32.1 G/DL (ref 31.4–35)
MCV RBC AUTO: 85.7 FL (ref 82–102)
NRBC # BLD: 0 K/UL (ref 0–0.2)
PLATELET # BLD AUTO: 163 K/UL (ref 150–450)
PMV BLD AUTO: 9 FL (ref 9.4–12.3)
POTASSIUM SERPL-SCNC: 4 MMOL/L (ref 3.5–5.1)
RBC # BLD AUTO: 4.21 M/UL (ref 4.23–5.6)
SERVICE CMNT-IMP: ABNORMAL
SODIUM SERPL-SCNC: 138 MMOL/L (ref 133–143)
WBC # BLD AUTO: 6.6 K/UL (ref 4.3–11.1)

## 2023-01-30 PROCEDURE — 52224 CYSTOSCOPY AND TREATMENT: CPT | Performed by: UROLOGY

## 2023-01-30 PROCEDURE — 6370000000 HC RX 637 (ALT 250 FOR IP): Performed by: ANESTHESIOLOGY

## 2023-01-30 PROCEDURE — 85027 COMPLETE CBC AUTOMATED: CPT

## 2023-01-30 PROCEDURE — 3600000012 HC SURGERY LEVEL 2 ADDTL 15MIN: Performed by: UROLOGY

## 2023-01-30 PROCEDURE — 7100000000 HC PACU RECOVERY - FIRST 15 MIN: Performed by: UROLOGY

## 2023-01-30 PROCEDURE — 80048 BASIC METABOLIC PNL TOTAL CA: CPT

## 2023-01-30 PROCEDURE — 88305 TISSUE EXAM BY PATHOLOGIST: CPT

## 2023-01-30 PROCEDURE — 3700000000 HC ANESTHESIA ATTENDED CARE: Performed by: UROLOGY

## 2023-01-30 PROCEDURE — 2500000003 HC RX 250 WO HCPCS: Performed by: NURSE ANESTHETIST, CERTIFIED REGISTERED

## 2023-01-30 PROCEDURE — 3700000001 HC ADD 15 MINUTES (ANESTHESIA): Performed by: UROLOGY

## 2023-01-30 PROCEDURE — 6360000002 HC RX W HCPCS: Performed by: ANESTHESIOLOGY

## 2023-01-30 PROCEDURE — 82962 GLUCOSE BLOOD TEST: CPT

## 2023-01-30 PROCEDURE — 2709999900 HC NON-CHARGEABLE SUPPLY: Performed by: UROLOGY

## 2023-01-30 PROCEDURE — 7100000001 HC PACU RECOVERY - ADDTL 15 MIN: Performed by: UROLOGY

## 2023-01-30 PROCEDURE — 7100000011 HC PHASE II RECOVERY - ADDTL 15 MIN: Performed by: UROLOGY

## 2023-01-30 PROCEDURE — 6360000002 HC RX W HCPCS: Performed by: NURSE ANESTHETIST, CERTIFIED REGISTERED

## 2023-01-30 PROCEDURE — 3600000002 HC SURGERY LEVEL 2 BASE: Performed by: UROLOGY

## 2023-01-30 PROCEDURE — 6360000002 HC RX W HCPCS: Performed by: UROLOGY

## 2023-01-30 PROCEDURE — 7100000010 HC PHASE II RECOVERY - FIRST 15 MIN: Performed by: UROLOGY

## 2023-01-30 PROCEDURE — 2580000003 HC RX 258: Performed by: NURSE ANESTHETIST, CERTIFIED REGISTERED

## 2023-01-30 RX ORDER — NEOSTIGMINE METHYLSULFATE 1 MG/ML
INJECTION, SOLUTION INTRAVENOUS PRN
Status: DISCONTINUED | OUTPATIENT
Start: 2023-01-30 | End: 2023-01-30 | Stop reason: SDUPTHER

## 2023-01-30 RX ORDER — SODIUM CHLORIDE 0.9 % (FLUSH) 0.9 %
5-40 SYRINGE (ML) INJECTION EVERY 12 HOURS SCHEDULED
Status: DISCONTINUED | OUTPATIENT
Start: 2023-01-30 | End: 2023-01-30 | Stop reason: HOSPADM

## 2023-01-30 RX ORDER — MIDAZOLAM HYDROCHLORIDE 2 MG/2ML
2 INJECTION, SOLUTION INTRAMUSCULAR; INTRAVENOUS
Status: COMPLETED | OUTPATIENT
Start: 2023-01-30 | End: 2023-01-30

## 2023-01-30 RX ORDER — FENTANYL CITRATE 50 UG/ML
INJECTION, SOLUTION INTRAMUSCULAR; INTRAVENOUS PRN
Status: DISCONTINUED | OUTPATIENT
Start: 2023-01-30 | End: 2023-01-30 | Stop reason: SDUPTHER

## 2023-01-30 RX ORDER — ONDANSETRON 2 MG/ML
INJECTION INTRAMUSCULAR; INTRAVENOUS PRN
Status: DISCONTINUED | OUTPATIENT
Start: 2023-01-30 | End: 2023-01-30 | Stop reason: SDUPTHER

## 2023-01-30 RX ORDER — LIDOCAINE HYDROCHLORIDE 20 MG/ML
INJECTION, SOLUTION EPIDURAL; INFILTRATION; INTRACAUDAL; PERINEURAL PRN
Status: DISCONTINUED | OUTPATIENT
Start: 2023-01-30 | End: 2023-01-30 | Stop reason: SDUPTHER

## 2023-01-30 RX ORDER — DIPHENHYDRAMINE HYDROCHLORIDE 50 MG/ML
12.5 INJECTION INTRAMUSCULAR; INTRAVENOUS
Status: DISCONTINUED | OUTPATIENT
Start: 2023-01-30 | End: 2023-01-30 | Stop reason: HOSPADM

## 2023-01-30 RX ORDER — SODIUM CHLORIDE, SODIUM LACTATE, POTASSIUM CHLORIDE, CALCIUM CHLORIDE 600; 310; 30; 20 MG/100ML; MG/100ML; MG/100ML; MG/100ML
INJECTION, SOLUTION INTRAVENOUS CONTINUOUS
Status: DISCONTINUED | OUTPATIENT
Start: 2023-01-30 | End: 2023-01-30 | Stop reason: HOSPADM

## 2023-01-30 RX ORDER — PROCHLORPERAZINE EDISYLATE 5 MG/ML
5 INJECTION INTRAMUSCULAR; INTRAVENOUS
Status: DISCONTINUED | OUTPATIENT
Start: 2023-01-30 | End: 2023-01-30 | Stop reason: HOSPADM

## 2023-01-30 RX ORDER — SODIUM CHLORIDE 9 MG/ML
INJECTION, SOLUTION INTRAVENOUS PRN
Status: DISCONTINUED | OUTPATIENT
Start: 2023-01-30 | End: 2023-01-30 | Stop reason: HOSPADM

## 2023-01-30 RX ORDER — SODIUM CHLORIDE 0.9 % (FLUSH) 0.9 %
5-40 SYRINGE (ML) INJECTION PRN
Status: DISCONTINUED | OUTPATIENT
Start: 2023-01-30 | End: 2023-01-30 | Stop reason: HOSPADM

## 2023-01-30 RX ORDER — LIDOCAINE HYDROCHLORIDE 10 MG/ML
1 INJECTION, SOLUTION INFILTRATION; PERINEURAL
Status: DISCONTINUED | OUTPATIENT
Start: 2023-01-30 | End: 2023-01-30 | Stop reason: HOSPADM

## 2023-01-30 RX ORDER — SODIUM CHLORIDE, SODIUM LACTATE, POTASSIUM CHLORIDE, CALCIUM CHLORIDE 600; 310; 30; 20 MG/100ML; MG/100ML; MG/100ML; MG/100ML
INJECTION, SOLUTION INTRAVENOUS CONTINUOUS PRN
Status: DISCONTINUED | OUTPATIENT
Start: 2023-01-30 | End: 2023-01-30 | Stop reason: SDUPTHER

## 2023-01-30 RX ORDER — ROCURONIUM BROMIDE 10 MG/ML
INJECTION, SOLUTION INTRAVENOUS PRN
Status: DISCONTINUED | OUTPATIENT
Start: 2023-01-30 | End: 2023-01-30 | Stop reason: SDUPTHER

## 2023-01-30 RX ORDER — DEXTROSE MONOHYDRATE 100 MG/ML
INJECTION, SOLUTION INTRAVENOUS CONTINUOUS PRN
Status: DISCONTINUED | OUTPATIENT
Start: 2023-01-30 | End: 2023-01-30 | Stop reason: HOSPADM

## 2023-01-30 RX ORDER — GLYCOPYRROLATE 0.2 MG/ML
INJECTION INTRAMUSCULAR; INTRAVENOUS PRN
Status: DISCONTINUED | OUTPATIENT
Start: 2023-01-30 | End: 2023-01-30 | Stop reason: SDUPTHER

## 2023-01-30 RX ORDER — ACETAMINOPHEN 500 MG
1000 TABLET ORAL ONCE
Status: COMPLETED | OUTPATIENT
Start: 2023-01-30 | End: 2023-01-30

## 2023-01-30 RX ORDER — OXYCODONE HYDROCHLORIDE 5 MG/1
5 TABLET ORAL
Status: DISCONTINUED | OUTPATIENT
Start: 2023-01-30 | End: 2023-01-30 | Stop reason: HOSPADM

## 2023-01-30 RX ORDER — HYDROMORPHONE HCL 110MG/55ML
0.5 PATIENT CONTROLLED ANALGESIA SYRINGE INTRAVENOUS EVERY 10 MIN PRN
Status: DISCONTINUED | OUTPATIENT
Start: 2023-01-30 | End: 2023-01-30 | Stop reason: HOSPADM

## 2023-01-30 RX ORDER — FENTANYL CITRATE 50 UG/ML
100 INJECTION, SOLUTION INTRAMUSCULAR; INTRAVENOUS
Status: DISCONTINUED | OUTPATIENT
Start: 2023-01-30 | End: 2023-01-30 | Stop reason: HOSPADM

## 2023-01-30 RX ORDER — PROPOFOL 10 MG/ML
INJECTION, EMULSION INTRAVENOUS PRN
Status: DISCONTINUED | OUTPATIENT
Start: 2023-01-30 | End: 2023-01-30 | Stop reason: SDUPTHER

## 2023-01-30 RX ADMIN — FENTANYL CITRATE 100 MCG: 50 INJECTION, SOLUTION INTRAMUSCULAR; INTRAVENOUS at 09:17

## 2023-01-30 RX ADMIN — ROCURONIUM BROMIDE 30 MG: 50 INJECTION, SOLUTION INTRAVENOUS at 09:24

## 2023-01-30 RX ADMIN — SODIUM CHLORIDE, SODIUM LACTATE, POTASSIUM CHLORIDE, AND CALCIUM CHLORIDE: 600; 310; 30; 20 INJECTION, SOLUTION INTRAVENOUS at 09:11

## 2023-01-30 RX ADMIN — PROPOFOL 50 MG: 10 INJECTION, EMULSION INTRAVENOUS at 09:40

## 2023-01-30 RX ADMIN — LIDOCAINE HYDROCHLORIDE 100 MG: 20 INJECTION, SOLUTION EPIDURAL; INFILTRATION; INTRACAUDAL; PERINEURAL at 09:17

## 2023-01-30 RX ADMIN — Medication 3 MG: at 09:42

## 2023-01-30 RX ADMIN — ACETAMINOPHEN 1000 MG: 500 TABLET, FILM COATED ORAL at 09:02

## 2023-01-30 RX ADMIN — PROPOFOL 200 MG: 10 INJECTION, EMULSION INTRAVENOUS at 09:17

## 2023-01-30 RX ADMIN — MIDAZOLAM 2 MG: 1 INJECTION INTRAMUSCULAR; INTRAVENOUS at 09:02

## 2023-01-30 RX ADMIN — Medication 3000 MG: at 09:24

## 2023-01-30 RX ADMIN — SODIUM CHLORIDE, SODIUM LACTATE, POTASSIUM CHLORIDE, AND CALCIUM CHLORIDE: 600; 310; 30; 20 INJECTION, SOLUTION INTRAVENOUS at 09:43

## 2023-01-30 RX ADMIN — GLYCOPYRROLATE 0.4 MG: 0.2 INJECTION INTRAMUSCULAR; INTRAVENOUS at 09:42

## 2023-01-30 RX ADMIN — ONDANSETRON 4 MG: 2 INJECTION INTRAMUSCULAR; INTRAVENOUS at 09:40

## 2023-01-30 ASSESSMENT — PAIN - FUNCTIONAL ASSESSMENT
PAIN_FUNCTIONAL_ASSESSMENT: 0-10
PAIN_FUNCTIONAL_ASSESSMENT: 0-10
PAIN_FUNCTIONAL_ASSESSMENT: NONE - DENIES PAIN

## 2023-01-30 ASSESSMENT — LIFESTYLE VARIABLES: SMOKING_STATUS: 1

## 2023-01-30 ASSESSMENT — PAIN DESCRIPTION - DESCRIPTORS: DESCRIPTORS: SORE

## 2023-01-30 NOTE — OP NOTE
[address] [facility]  OPERATIVE REPORT    Name:  Ruchi Roth  MR#:  [id number]  :  1965  ACCOUNT #:  [de-identified]  DATE OF SERVICE:  2023    PREOPERATIVE DIAGNOSIS:  Gross hematuria and bladder lesion. POSTOPERATIVE DIAGNOSIS:  Gross hematuria and bladder lesion. PROCEDURE PERFORMED:  Cystourethroscopy, bladder biopsy, fulguration. SURGEON:  Moreen Jeans, MD    ASSISTANT:  None. ANESTHESIA:  General.    COMPLICATIONS:  None apparent. SPECIMENS REMOVED:  Bladder biopsy. IMPLANTS:  None. ESTIMATED BLOOD LOSS:  Minimal.    INDICATIONS:  This is a 69-year-old gentleman on Xarelto who is having intermittent gross hematuria. CAT scan with delayed images of the upper tracts revealed no pathology. Cystoscopy in the office revealed a clot adherent to the right dome and due to continued gross hematuria, today's procedure was scheduled. FINDINGS:  The patient had no sign of active bleeding today, whatsoever, consistent with him telling me he had no gross hematuria in the last week or 10 days after stopping Xarelto. Within the bladder, he had several small areas of telangiectasia diffusely. The largest which was at the right dome where I suspect the clot when cystoscopy was performed in the office previously, nothing had the appearance of a transitional cell carcinoma. PROCEDURE:  The patient was taken to operating room, placed in supine position. Anesthesia was induced via Anesthesiology service. He was repositioned in the lithotomy position and prepped and draped in sterile surgical fashion with genitalia in sterile field. A 22-Nepali rigid cystoscope was introduced atraumatically via the urethra. Panurethroscopy and cystoscopy was performed. Prostate was 2.5 to 3 cm in length with mild lateral lobe enlargement. No friable vessels upon the prostatic urethra and no sign of prostatic bleeding. Within the bladder, the bilateral ureteral orifices efflux clear urine.   He had several areas within the bladder of very small patches of telangiectasia. The largest of this was biopsied and there was a one cold cup biopsy forceps bite and was sent to Pathology, labeled bladder biopsy. I then used the Bugbee to fulgurate these areas such that everything was completely hemostatic by end of the case. I then emptied the bladder, checked everything for hemostasis and removed the scope. The patient tolerated procedure well, was taken to PACU in stable condition. PLAN:  The patient will remain off Xarelto for one week. If when he starts it back in one week, he has continued gross hematuria, he will hold it for another week. I will see him in the office in approximately one month and he can call the office sooner if he has questions or concerns and he knows that.       MD GINNA [email or web address]  D:  01/30/2023 9:57  T:  01/30/2023 14:02  JOB #:  [id number]

## 2023-01-30 NOTE — ANESTHESIA POSTPROCEDURE EVALUATION
Department of Anesthesiology  Postprocedure Note    Patient: Karly Story  MRN: [id number]  YOB: 1965  Date of evaluation: 1/30/2023      Procedure Summary     Date: 01/30/23 Room / Location: [facility] MAIN OR 01 CYSTO / [facility] MAIN OR    Anesthesia Start: 0901 Anesthesia Stop: 1004    Procedure: CYSTOSCOPY BLADDER BIOPSY, FULGURATION Diagnosis:       Gross hematuria      (Gross hematuria [R31.0])    Providers: Horacio Sparrow MD Responsible Provider: Rufino Stoner MD    Anesthesia Type: general ASA Status: 3          Anesthesia Type: No value filed. Tatum Phase I: Tatum Score: 10    Tatum Phase II: Tatum Score: 10      Anesthesia Post Evaluation    Patient location during evaluation: PACU  Patient participation: complete - patient participated  Level of consciousness: awake and alert  Airway patency: patent  Nausea: well controlled. Complications: no  Cardiovascular status: acceptable.   Respiratory status: acceptable  Hydration status: stable

## 2023-01-30 NOTE — BRIEF OP NOTE
Brief Postoperative Note      Patient: Sergey Crowley  YOB: 1965  MRN: [id number]    Date of Procedure: 1/30/2023    Pre-Op Diagnosis: Gross hematuria [R31.0]    Post-Op Diagnosis: Same       Procedure(s):  CYSTOSCOPY BLADDER BIOPSY, FULGURATION    Surgeon(s):  Lord Shobha MD    Assistant:  * No surgical staff found *    Anesthesia: General    Estimated Blood Loss (mL): Minimal    Complications: None    Specimens:   ID Type Source Tests Collected by Time Destination   A : Bladder Biopsy  Tissue Bladder SURGICAL PATHOLOGY Lord Shobha MD 1/30/2023 0082        Implants:  * No implants in log *      Drains: * No LDAs found *    Findings: see dictation    Electronically signed by Vanessa Cartagena MD on 1/30/2023 at 9:50 AM

## 2023-01-30 NOTE — DISCHARGE INSTRUCTIONS
Your Recovery  Your urethra may be sore at first, and it may burn when you urinate for the first few days after the procedure. You may feel the need to urinate more often, and your urine may be pink. These symptoms should get better in a few days. How can you care for yourself at home? Activity  Rest when you feel tired. Getting enough sleep will help you recover. Avoid strenuous activities, such as bicycle riding, jogging, weight lifting, or aerobic exercise, until your doctor says it is okay. Ask your doctor when you can drive again. Most people are able to return to work within 1 or 2 days after the procedure. You may shower as usual.  Ask your doctor when it is okay for you to have sex. Diet  You can eat your normal diet. If your stomach is upset, try bland, low-fat foods like plain rice, broiled chicken, toast, and yogurt. Drink plenty of fluids (unless your doctor tells you not to). Medicines  If you take a narcotic pain medication, take a stool softender. Follow-up care is a key part of your treatment and safety. Be sure to make and go to all appointments, and call your doctor if you are having problems. It's also a good idea to know your test results and keep a list of the medicines you take. When should you call for help? Call 911 anytime you think you may need emergency care. For example, call if:  You passed out (lost consciousness). You have severe trouble breathing. You have sudden chest pain and shortness of breath, or you cough up blood. You have severe belly pain. Call your doctor now or seek immediate medical care if:  You are sick to your stomach or cannot keep fluids down. Your urine is still red or you see blood clots after you have urinated several times. You have trouble passing urine. You have signs of a blood clot, such as:  Pain in your calf, back of the knee, thigh, or groin. Redness and swelling in your leg or groin. You develop a fever or severe chills.   Watch closely for changes in your health, and be sure to contact your doctor if:  A burning feeling is normal for a day or two after the test, but call if it does not get better. You have a frequent urge to urinate but can pass only small amounts of urine. It is normal for the urine to have a pinkish color for a few days after the test, but call if it does not get better or if your urine is cloudy, smells bad, or has pus in it. After general anesthesia or intravenous sedation, for 24 hours or while taking prescription Narcotics:  Limit your activities  A responsible adult needs to be with you for the next 24 hours  Do not drive and operate hazardous machinery  Do not make important personal or business decisions  Do not drink alcoholic beverages  If you have not urinated within 8 hours after discharge, and you are experiencing discomfort from urinary retention, please go to the nearest ED. If you have sleep apnea and have a CPAP machine, please use it for all naps and sleeping. Please use caution when taking narcotics and any of your home medications that may cause drowsiness. *  Please give a list of your current medications to your Primary Care Provider. *  Please update this list whenever your medications are discontinued, doses are      changed, or new medications (including over-the-counter products) are added. *  Please carry medication information at all times in case of emergency situations. These are general instructions for a healthy lifestyle:  No smoking/ No tobacco products/ Avoid exposure to second hand smoke  Surgeon General's Warning:  Quitting smoking now greatly reduces serious risk to your health.   Obesity, smoking, and sedentary lifestyle greatly increases your risk for illness  A healthy diet, regular physical exercise & weight monitoring are important for maintaining a healthy lifestyle    You may be retaining fluid if you have a history of heart failure or if you experience any of the following symptoms:  Weight gain of 3 pounds or more overnight or 5 pounds in a week, increased swelling in our hands or feet or shortness of breath while lying flat in bed. Please call your doctor as soon as you notice any of these symptoms; do not wait until your next office visit.

## 2023-01-30 NOTE — ANESTHESIA PRE PROCEDURE
Department of Anesthesiology  Preprocedure Note       Name:  Lashawn Ramirez   Age:  65 y.o.  :  1965                                          MRN:  [id number]         Date:  2023      Surgeon: Tony Gutierrez):  Cristina Hawthorne MD    Procedure: Procedure(s):  CYSTOSCOPY BLADDER BIOPSY, FULGURATION    Medications prior to admission:   Prior to Admission medications    Medication Sig Start Date End Date Taking? Authorizing Provider   glipiZIDE (GLUCOTROL) 10 MG tablet Take 10 mg by mouth daily   Yes Historical Provider, MD   lacosamide (VIMPAT) 100 MG TABS tablet Take 100 mg by mouth 2 times daily. 1 tablet in am and 1.5 tablet in evening   Yes Historical Provider, MD   metFORMIN (GLUCOPHAGE) 1000 MG tablet Take 1,000 mg by mouth 2 times daily (with meals)   Yes Historical Provider, MD   MELATONIN PO Take by mouth nightly   Yes Historical Provider, MD   NONFORMULARY Take 4 tablets by mouth daily Focus Factor   Yes Historical Provider, MD   Papav-Phentolamine-Alprostadil (SUPER TRI-MIX) 150- MG-MG-MCG SOLR 1 each by IntraCAVernosal route as needed (erectile dysfunction) PGE1: 40 mcg/ml; Papaverine: 30 mg/ml, Phentolamine: 3 mg/ml;  + all other supplies as needed. 22   Cristina Hawthorne MD   PARoxetine (PAXIL) 20 MG tablet Take 20 mg by mouth daily    Historical Provider, MD   finasteride (PROSCAR) 5 MG tablet Take 1 tablet by mouth daily 22 SUMANTH Andrade - NP   fexofenadine (ALLEGRA) 180 MG tablet Take 180 mg by mouth daily    Ar Automatic Reconciliation   gabapentin (NEURONTIN) 300 MG capsule Take 300 mg by mouth 3 times daily.  20   Ar Automatic Reconciliation   losartan (COZAAR) 100 MG tablet Take 100 mg by mouth nightly    Ar Automatic Reconciliation   pravastatin (PRAVACHOL) 20 MG tablet Take 20 mg by mouth nightly    Ar Automatic Reconciliation   rivaroxaban (XARELTO) 20 MG TABS tablet Take 20 mg by mouth daily (with breakfast)    Ar Automatic Reconciliation   rOPINIRole (REQUIP) 1 MG tablet Take 1 mg by mouth 3 times daily    Ar Automatic Reconciliation   triamcinolone (NASACORT) 55 MCG/ACT nasal inhaler 2 sprays by Nasal route nightly    Ar Automatic Reconciliation   zolpidem (AMBIEN CR) 12.5 MG extended release tablet Take 12.5 mg by mouth nightly.     Ar Automatic Reconciliation       Current medications:    Current Facility-Administered Medications   Medication Dose Route Frequency Provider Last Rate Last Admin    lidocaine 1 % injection 1 mL  1 mL IntraDERmal Once PRN Carrillo Franco MD        acetaminophen (TYLENOL) tablet 1,000 mg  1,000 mg Oral Once Carrillo Franco MD        fentaNYL (SUBLIMAZE) injection 100 mcg  100 mcg IntraVENous Once PRN Carrillo Franco MD        lactated ringers IV soln infusion   IntraVENous Continuous Carrillo Franco MD        sodium chloride flush 0.9 % injection 5-40 mL  5-40 mL IntraVENous 2 times per day Carrillo Franco MD        sodium chloride flush 0.9 % injection 5-40 mL  5-40 mL IntraVENous PRN Carrillo Franco MD        0.9 % sodium chloride infusion   IntraVENous PRN Carrillo Franco MD        midazolam PF (VERSED) injection 2 mg  2 mg IntraVENous Once PRN Carrillo Franco MD        ceFAZolin (ANCEF) 3000 mg in sterile water 30 mL IV syringe  3,000 mg IntraVENous On Call to [facility] Kylie Fishman MD           Allergies:  No Known Allergies    Problem List:    Patient Active Problem List   Diagnosis Code    Gross hematuria R31.0       Past Medical History:        Diagnosis Date    Cigarette nicotine dependence     Current use of long term anticoagulation     Xarelto currently on hold    Gross hematuria     History of DVT (deep vein thrombosis)     post-op back surgery to LLE    History of staph infection     Staph infection to back- cause temporary paralysis    Hypercholesteremia     Hypertension     Insomnia     Morbid obesity ([facility] [address]     BMI 40    SNEHA (obstructive sleep apnea)     complaint with CPAP    Seasonal allergies     Spasticity to lower extremities due to hx of paralysis    Type 2 diabetes mellitus (HCC)     oral agent only/AVG BS: 150/no s.s of hypoglycemia/Last A1c: 6 range per pt. Past Surgical History:        Procedure Laterality Date    BACK SURGERY  2006    COLONOSCOPY      OTHER SURGICAL HISTORY Left 1990    knee surgery and pin placed    SEPTOPLASTY      WISDOM TOOTH EXTRACTION         Social History:    Social History     Tobacco Use    Smoking status: Every Day     Packs/day: 1.00     Years: 30.00     Pack years: 30.00     Types: Cigarettes    Smokeless tobacco: Never    Tobacco comments:     Pt states he is almost \"done smoking\" attempting to quit. Substance Use Topics    Alcohol use: Yes     Comment: occassionally                                Ready to quit: Yes  Counseling given: Not Answered  Tobacco comments: Pt states he is almost \"done smoking\" attempting to quit. Vital Signs (Current):   Vitals:    01/25/23 0941 01/30/23 0744   BP:  (!) 142/69   Pulse:  82   Resp:  16   Temp:  97.9 °F (36.6 °C)   TempSrc:  Tympanic   SpO2:  96%   Weight: (!) 320 lb (145.2 kg) (!) 320 lb 6.4 oz (145.3 kg)   Height: 6' 3\" (1.905 m) 6' 3\" (1.905 m)                                              BP Readings from Last 3 Encounters:   01/30/23 (!) 142/69   09/14/21 (!) 141/97       NPO Status: Time of last liquid consumption: 1900                        Time of last solid consumption: 1900                        Date of last liquid consumption: 01/29/23                        Date of last solid food consumption: 01/29/23    BMI:   Wt Readings from Last 3 Encounters:   01/30/23 (!) 320 lb 6.4 oz (145.3 kg)   09/14/21 (!) 319 lb (144.7 kg)     Body mass index is 40.05 kg/m².     CBC: No results found for: WBC, RBC, HGB, HCT, MCV, RDW, PLT    CMP:   Lab Results   Component Value Date/Time    CREATININE 0.67 11/16/2022 01:46 PM       POC Tests: No results for input(s): POCGLU, POCNA, POCK, POCCL, POCBUN, POCHEMO, POCHCT in the last 72 hours. Coags: No results found for: PROTIME, INR, APTT    HCG (If Applicable): No results found for: PREGTESTUR, PREGSERUM, HCG, HCGQUANT     ABGs: No results found for: PHART, PO2ART, YYC7XOO, TSJ7EHT, BEART, F9ALGYRE     Type & Screen (If Applicable):  No results found for: LABABO, LABRH    Drug/Infectious Status (If Applicable):  No results found for: HIV, HEPCAB    COVID-19 Screening (If Applicable): No results found for: COVID19        Anesthesia Evaluation  Patient summary reviewed and Nursing notes reviewed no history of anesthetic complications:   Airway: Mallampati: III  TM distance: >3 FB   Neck ROM: full  Comment: Small mouth opening  Short, thick neck and goatee  Mouth opening: < 3 FB   Dental: normal exam         Pulmonary: breath sounds clear to auscultation  (+) sleep apnea: on CPAP,  current smoker                           Cardiovascular:  Exercise tolerance: no interval change,   (+) hypertension:, hyperlipidemia        Rhythm: regular  Rate: normal                    Neuro/Psych:                ROS comment: At one point he had significant weakness in his LE from prior back surgery/infection - has since improved and gets around on his own without issues other than spasms    Neuropathy  GI/Hepatic/Renal:   (+) GERD: well controlled, morbid obesity          Endo/Other:    (+) DiabetesType II DM, , : arthritis:., .                 Abdominal:             Vascular:   + PVD, aortic or cerebral (Iliac vein stent), DVT (normally on xarelto but held perioperatively ), .        ROS comment: H/o AVM in L leg . Other Findings:           Anesthesia Plan      general     ASA 3     (GETA, glidescope )  Induction: intravenous. Anesthetic plan and risks discussed with patient.                         Jass Thurman MD   1/30/2023

## 2023-02-08 ENCOUNTER — PATIENT MESSAGE (OUTPATIENT)
Dept: NEUROLOGY | Age: 58
End: 2023-02-08

## 2023-02-08 DIAGNOSIS — G40.909 NONINTRACTABLE EPILEPSY WITHOUT STATUS EPILEPTICUS, UNSPECIFIED EPILEPSY TYPE (HCC): Primary | ICD-10-CM

## 2023-02-08 NOTE — TELEPHONE ENCOUNTER
From: Columba Palacios  To: Dr. Marie Settler: 2/8/2023 2:38 PM EST  Subject: Lacosamide prescription     [facility], I am out of refills for lacosamide. Could you call in a refill to the [facility] on [facility] in [place] Thanks you!     Tom An

## 2023-02-09 RX ORDER — LACOSAMIDE 100 MG/1
TABLET ORAL
Qty: 225 TABLET | Refills: 3 | Status: SHIPPED | OUTPATIENT
Start: 2023-02-09 | End: 2024-02-08

## 2023-03-06 ENCOUNTER — TELEPHONE (OUTPATIENT)
Dept: UROLOGY | Age: 58
End: 2023-03-06

## 2023-08-21 DIAGNOSIS — G40.909 NONINTRACTABLE EPILEPSY WITHOUT STATUS EPILEPTICUS, UNSPECIFIED EPILEPSY TYPE (HCC): ICD-10-CM

## 2023-08-25 DIAGNOSIS — G40.909 NONINTRACTABLE EPILEPSY WITHOUT STATUS EPILEPTICUS, UNSPECIFIED EPILEPSY TYPE (HCC): ICD-10-CM

## 2023-08-25 RX ORDER — LACOSAMIDE 100 MG/1
TABLET ORAL
Qty: 225 TABLET | OUTPATIENT
Start: 2023-08-25

## 2023-08-25 RX ORDER — LACOSAMIDE 100 MG/1
TABLET ORAL
Qty: 225 TABLET | Refills: 3 | Status: SHIPPED | OUTPATIENT
Start: 2023-08-25 | End: 2024-08-23

## 2023-08-25 RX ORDER — LACOSAMIDE 100 MG/1
TABLET ORAL
Qty: 225 TABLET | Refills: 3 | Status: SHIPPED | OUTPATIENT
Start: 2023-08-25 | End: 2023-08-25 | Stop reason: SDUPTHER

## 2024-02-08 DIAGNOSIS — G40.909 NONINTRACTABLE EPILEPSY WITHOUT STATUS EPILEPTICUS, UNSPECIFIED EPILEPSY TYPE (HCC): ICD-10-CM

## 2024-02-08 RX ORDER — LACOSAMIDE 100 MG/1
TABLET ORAL
Qty: 225 TABLET | Refills: 3 | Status: SHIPPED | OUTPATIENT
Start: 2024-02-08 | End: 2025-02-06

## 2024-04-25 DIAGNOSIS — G40.909 NONINTRACTABLE EPILEPSY WITHOUT STATUS EPILEPTICUS, UNSPECIFIED EPILEPSY TYPE (HCC): ICD-10-CM

## 2024-04-25 RX ORDER — LACOSAMIDE 100 MG/1
TABLET ORAL
Qty: 225 TABLET | Refills: 3 | Status: SHIPPED | OUTPATIENT
Start: 2024-04-25 | End: 2025-04-24

## 2024-04-25 NOTE — TELEPHONE ENCOUNTER
Pt is wanting to transfer VIMPAT 100mg prescription from [facility] to [facility]. The pharmacy information is:     Electronic prescription  [facility]  [address], [address]  [place] [address]  Phone  [phone]  Fax  [phone]

## 2024-07-09 ENCOUNTER — OFFICE VISIT (OUTPATIENT)
Dept: NEUROLOGY | Age: 59
End: 2024-07-09
Payer: MEDICARE

## 2024-07-09 DIAGNOSIS — M46.20 SPINAL ABSCESS (HCC): Primary | ICD-10-CM

## 2024-07-09 DIAGNOSIS — G62.9 NEUROPATHY: ICD-10-CM

## 2024-07-09 DIAGNOSIS — G25.3 SPINAL CORD MYOCLONUS: ICD-10-CM

## 2024-07-09 PROCEDURE — G8421 BMI NOT CALCULATED: HCPCS | Performed by: PSYCHIATRY & NEUROLOGY

## 2024-07-09 PROCEDURE — 4004F PT TOBACCO SCREEN RCVD TLK: CPT | Performed by: PSYCHIATRY & NEUROLOGY

## 2024-07-09 PROCEDURE — G8427 DOCREV CUR MEDS BY ELIG CLIN: HCPCS | Performed by: PSYCHIATRY & NEUROLOGY

## 2024-07-09 PROCEDURE — 3017F COLORECTAL CA SCREEN DOC REV: CPT | Performed by: PSYCHIATRY & NEUROLOGY

## 2024-07-09 PROCEDURE — 99214 OFFICE O/P EST MOD 30 MIN: CPT | Performed by: PSYCHIATRY & NEUROLOGY

## 2024-07-09 RX ORDER — DULOXETIN HYDROCHLORIDE 30 MG/1
30 CAPSULE, DELAYED RELEASE ORAL DAILY
Qty: 30 CAPSULE | Refills: 8 | Status: SHIPPED | OUTPATIENT
Start: 2024-07-09

## 2024-07-09 ASSESSMENT — ENCOUNTER SYMPTOMS
EYES NEGATIVE: 1
RESPIRATORY NEGATIVE: 1

## 2024-07-09 NOTE — PROGRESS NOTES
[facility] NEUROLOG[facility]  [address], [address]  [place] [address]  Phone: [phone] Fax [phone]  Dr. Chaparro Strange      7/9/2024  Balwinder Palacios     Patient is referred by the following provider for consultation regarding as below:       I reviewed the available records and notes and have examined patient with the following findings:     Chief Complaint:  No chief complaint on file.         HPI: This is a right handed 58 y.o. 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      In 2006 this gentleman lost control of his bilateral lower extremities and bowel and bladder it was found to thoracic abscess he required surgical intervention and antibiotics and was left with bilateral lower extremity left greater than right spasticity and spinal myoclonus it is going on for over 10 years.  He was put on Requip very high doses 5 a day even higher but then dropped down to 3 a day which she still has some OCD behavior isms.  He also is on Vimpat 100 mg 1 in the morning 1-1/2 at night for the spinal myoclonus and spasticity which has helped a little bit.  This is also on top of gabapentin 800 mg she takes 3 a day and Paxil.  He is on Eliquis for DVT.  Unfortunately he has had severe upper as well as lower extremity pins-and-needles numbness tingling we did his EMG and nerve study that did show carpal tunnel syndrome?  And since neuropathy.  We sent him to the hand center and they felt this was all peripheral neuropathy and no carpal tunnel syndrome and nothing they could offer him.  He has tried marijuana as well as CBD in the past but [facility] reinvestigate this.    In the past he has had Depakote Trileptal Knechtel high-dose gabapentin and Requip he is tried and has been on.    I do not believe he has had

## 2024-09-01 DIAGNOSIS — G40.909 NONINTRACTABLE EPILEPSY WITHOUT STATUS EPILEPTICUS, UNSPECIFIED EPILEPSY TYPE (HCC): ICD-10-CM

## 2024-09-03 DIAGNOSIS — G40.909 NONINTRACTABLE EPILEPSY WITHOUT STATUS EPILEPTICUS, UNSPECIFIED EPILEPSY TYPE (HCC): ICD-10-CM

## 2024-09-03 RX ORDER — LACOSAMIDE 100 MG/1
TABLET ORAL
Qty: 225 TABLET | OUTPATIENT
Start: 2024-09-03

## 2024-09-03 RX ORDER — LACOSAMIDE 100 MG/1
TABLET ORAL
Qty: 75 TABLET | Refills: 5 | Status: SHIPPED | OUTPATIENT
Start: 2024-09-03 | End: 2025-09-02

## 2024-10-17 ENCOUNTER — TELEPHONE (OUTPATIENT)
Dept: NEUROLOGY | Age: 59
End: 2024-10-17

## 2024-10-17 DIAGNOSIS — G40.909 NONINTRACTABLE EPILEPSY WITHOUT STATUS EPILEPTICUS, UNSPECIFIED EPILEPSY TYPE (HCC): ICD-10-CM

## 2024-10-17 RX ORDER — LACOSAMIDE 100 MG/1
TABLET ORAL
Qty: 75 TABLET | Refills: 5 | Status: SHIPPED | OUTPATIENT
Start: 2024-10-17 | End: 2025-10-16

## 2024-10-17 NOTE — TELEPHONE ENCOUNTER
[facility] called, lacosamide was out of stock and then got deleted. They are asking for us to send a new script     Lacosamide 100mg tab, please send to [facility]

## 2025-02-25 DIAGNOSIS — G40.909 NONINTRACTABLE EPILEPSY WITHOUT STATUS EPILEPTICUS, UNSPECIFIED EPILEPSY TYPE (HCC): ICD-10-CM

## 2025-02-25 RX ORDER — LACOSAMIDE 100 MG/1
TABLET ORAL
Qty: 75 TABLET | Refills: 5 | Status: SHIPPED | OUTPATIENT
Start: 2025-02-25 | End: 2026-02-24

## 2025-02-25 RX ORDER — DULOXETIN HYDROCHLORIDE 30 MG/1
30 CAPSULE, DELAYED RELEASE ORAL DAILY
Qty: 90 CAPSULE | Refills: 3 | Status: SHIPPED | OUTPATIENT
Start: 2025-02-25

## 2025-09-05 DIAGNOSIS — N52.9 ERECTILE DYSFUNCTION, UNSPECIFIED ERECTILE DYSFUNCTION TYPE: ICD-10-CM

## (undated) DEVICE — PACK SURGICAL PROCEDURE KIT CYSTOSCOPY TOTE

## (undated) DEVICE — GOWN,REINFORCED,POLY,AURORA,XXLARGE,STR: Brand: MEDLINE

## (undated) DEVICE — SOLUTION IRRIG 3000ML H2O STRL BAG

## (undated) DEVICE — GLOVE ORANGE PI 8 1/2   MSG9085

## (undated) DEVICE — ELECTRODE PT RET AD L9FT HI MOIST COND ADH HYDRGEL CORDED